# Patient Record
Sex: MALE | Race: WHITE | NOT HISPANIC OR LATINO | URBAN - METROPOLITAN AREA
[De-identification: names, ages, dates, MRNs, and addresses within clinical notes are randomized per-mention and may not be internally consistent; named-entity substitution may affect disease eponyms.]

---

## 2018-04-24 ENCOUNTER — EMERGENCY (EMERGENCY)
Facility: HOSPITAL | Age: 43
LOS: 1 days | Discharge: HOME | End: 2018-04-24
Attending: EMERGENCY MEDICINE | Admitting: EMERGENCY MEDICINE
Payer: MEDICAID

## 2018-04-24 VITALS
WEIGHT: 169.98 LBS | OXYGEN SATURATION: 100 % | SYSTOLIC BLOOD PRESSURE: 117 MMHG | DIASTOLIC BLOOD PRESSURE: 81 MMHG | TEMPERATURE: 98 F | RESPIRATION RATE: 17 BRPM | HEART RATE: 97 BPM

## 2018-04-24 DIAGNOSIS — S00.81XA ABRASION OF OTHER PART OF HEAD, INITIAL ENCOUNTER: ICD-10-CM
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
## 2018-04-24 DIAGNOSIS — S00.83XA CONTUSION OF OTHER PART OF HEAD, INITIAL ENCOUNTER: ICD-10-CM

## 2018-04-24 DIAGNOSIS — F10.129 ALCOHOL ABUSE WITH INTOXICATION, UNSPECIFIED: ICD-10-CM

## 2018-04-24 DIAGNOSIS — F10.929 ALCOHOL USE, UNSPECIFIED WITH INTOXICATION, UNSPECIFIED: ICD-10-CM
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
## 2018-04-24 LAB — ETHANOL SERPL-MCNC: <3 MG/DL — SIGNIFICANT CHANGE UP

## 2018-04-24 PROCEDURE — 70450 CT HEAD/BRAIN W/O DYE: CPT | Mod: 26

## 2018-04-24 PROCEDURE — 99220: CPT

## 2018-04-24 RX ADMIN — Medication 25 MILLIGRAM(S): at 06:31

## 2018-04-24 NOTE — ED ADULT TRIAGE NOTE - CHIEF COMPLAINT QUOTE
pt. aaox3, bought in by EMS due to ETOH. pt. states he was drinking 36 beers the past two days and is having shakes, pt. speaking complete full sentences, steady gait.

## 2018-04-24 NOTE — ED ADULT NURSE NOTE - OBJECTIVE STATEMENT
Pt presents to ED with c/o EtOH withdrawal. Patient states his last drink was 5 minutes previous to arrival. No tremulousness or tongue fasciculations appreciated.  Patient with abrasion/scabbing to forehead, GCS 15, ambulatory.

## 2018-04-24 NOTE — ED PROVIDER NOTE - MEDICAL DECISION MAKING DETAILS
facial abrasion, alcohol use, will CT head, check blood alcohol, observe for patient safety, patient brought to ED by EMS

## 2018-04-24 NOTE — ED ADULT NURSE NOTE - CHPI ED SYMPTOMS NEG
no abdominal distension/no pain/no confusion/no abdominal pain/no nausea/no fever/no vomiting/no weakness/no disorientation/no chills

## 2020-01-11 ENCOUNTER — EMERGENCY (EMERGENCY)
Facility: HOSPITAL | Age: 45
LOS: 1 days | Discharge: HOME | End: 2020-01-11
Admitting: EMERGENCY MEDICINE
Payer: SELF-PAY

## 2020-01-11 VITALS
DIASTOLIC BLOOD PRESSURE: 76 MMHG | OXYGEN SATURATION: 96 % | HEART RATE: 94 BPM | RESPIRATION RATE: 20 BRPM | TEMPERATURE: 99 F | SYSTOLIC BLOOD PRESSURE: 131 MMHG

## 2020-01-11 VITALS
SYSTOLIC BLOOD PRESSURE: 137 MMHG | OXYGEN SATURATION: 97 % | HEART RATE: 98 BPM | WEIGHT: 203.05 LBS | RESPIRATION RATE: 18 BRPM | TEMPERATURE: 98 F | HEIGHT: 69 IN | DIASTOLIC BLOOD PRESSURE: 83 MMHG

## 2020-01-11 PROBLEM — Z78.9 OTHER SPECIFIED HEALTH STATUS: Chronic | Status: ACTIVE | Noted: 2018-04-24

## 2020-01-11 LAB
ALBUMIN SERPL ELPH-MCNC: 4.1 G/DL — SIGNIFICANT CHANGE UP (ref 3.4–5)
ALP SERPL-CCNC: 94 U/L — SIGNIFICANT CHANGE UP (ref 40–120)
ALT FLD-CCNC: 37 U/L — SIGNIFICANT CHANGE UP (ref 12–42)
ANION GAP SERPL CALC-SCNC: 6 MMOL/L — LOW (ref 9–16)
APPEARANCE UR: CLEAR — SIGNIFICANT CHANGE UP
AST SERPL-CCNC: 37 U/L — SIGNIFICANT CHANGE UP (ref 15–37)
BASOPHILS # BLD AUTO: 0.04 K/UL — SIGNIFICANT CHANGE UP (ref 0–0.2)
BASOPHILS NFR BLD AUTO: 0.2 % — SIGNIFICANT CHANGE UP (ref 0–2)
BILIRUB SERPL-MCNC: 0.5 MG/DL — SIGNIFICANT CHANGE UP (ref 0.2–1.2)
BILIRUB UR-MCNC: NEGATIVE — SIGNIFICANT CHANGE UP
BUN SERPL-MCNC: 15 MG/DL — SIGNIFICANT CHANGE UP (ref 7–23)
CALCIUM SERPL-MCNC: 9.1 MG/DL — SIGNIFICANT CHANGE UP (ref 8.5–10.5)
CHLORIDE SERPL-SCNC: 100 MMOL/L — SIGNIFICANT CHANGE UP (ref 96–108)
CO2 SERPL-SCNC: 30 MMOL/L — SIGNIFICANT CHANGE UP (ref 22–31)
COLOR SPEC: YELLOW — SIGNIFICANT CHANGE UP
CREAT SERPL-MCNC: 1 MG/DL — SIGNIFICANT CHANGE UP (ref 0.5–1.3)
DIFF PNL FLD: NEGATIVE — SIGNIFICANT CHANGE UP
EOSINOPHIL # BLD AUTO: 0.03 K/UL — SIGNIFICANT CHANGE UP (ref 0–0.5)
EOSINOPHIL NFR BLD AUTO: 0.2 % — SIGNIFICANT CHANGE UP (ref 0–6)
ETHANOL SERPL-MCNC: <3 MG/DL — SIGNIFICANT CHANGE UP
GLUCOSE SERPL-MCNC: 109 MG/DL — HIGH (ref 70–99)
GLUCOSE UR QL: NEGATIVE — SIGNIFICANT CHANGE UP
HCT VFR BLD CALC: 44.8 % — SIGNIFICANT CHANGE UP (ref 39–50)
HGB BLD-MCNC: 15.6 G/DL — SIGNIFICANT CHANGE UP (ref 13–17)
IMM GRANULOCYTES NFR BLD AUTO: 0.4 % — SIGNIFICANT CHANGE UP (ref 0–1.5)
KETONES UR-MCNC: NEGATIVE — SIGNIFICANT CHANGE UP
LEUKOCYTE ESTERASE UR-ACNC: NEGATIVE — SIGNIFICANT CHANGE UP
LYMPHOCYTES # BLD AUTO: 1.25 K/UL — SIGNIFICANT CHANGE UP (ref 1–3.3)
LYMPHOCYTES # BLD AUTO: 7.7 % — LOW (ref 13–44)
MCHC RBC-ENTMCNC: 29.4 PG — SIGNIFICANT CHANGE UP (ref 27–34)
MCHC RBC-ENTMCNC: 34.8 GM/DL — SIGNIFICANT CHANGE UP (ref 32–36)
MCV RBC AUTO: 84.5 FL — SIGNIFICANT CHANGE UP (ref 80–100)
MONOCYTES # BLD AUTO: 1.32 K/UL — HIGH (ref 0–0.9)
MONOCYTES NFR BLD AUTO: 8.2 % — SIGNIFICANT CHANGE UP (ref 2–14)
NEUTROPHILS # BLD AUTO: 13.48 K/UL — HIGH (ref 1.8–7.4)
NEUTROPHILS NFR BLD AUTO: 83.3 % — HIGH (ref 43–77)
NITRITE UR-MCNC: NEGATIVE — SIGNIFICANT CHANGE UP
NRBC # BLD: 0 /100 WBCS — SIGNIFICANT CHANGE UP (ref 0–0)
NT-PROBNP SERPL-SCNC: 33 PG/ML — SIGNIFICANT CHANGE UP
PCP SPEC-MCNC: SIGNIFICANT CHANGE UP
PH UR: 7 — SIGNIFICANT CHANGE UP (ref 5–8)
PLATELET # BLD AUTO: 302 K/UL — SIGNIFICANT CHANGE UP (ref 150–400)
POTASSIUM SERPL-MCNC: 5 MMOL/L — SIGNIFICANT CHANGE UP (ref 3.5–5.3)
POTASSIUM SERPL-SCNC: 5 MMOL/L — SIGNIFICANT CHANGE UP (ref 3.5–5.3)
PROT SERPL-MCNC: 8.1 G/DL — SIGNIFICANT CHANGE UP (ref 6.4–8.2)
PROT UR-MCNC: NEGATIVE MG/DL — SIGNIFICANT CHANGE UP
RBC # BLD: 5.3 M/UL — SIGNIFICANT CHANGE UP (ref 4.2–5.8)
RBC # FLD: 12.6 % — SIGNIFICANT CHANGE UP (ref 10.3–14.5)
SODIUM SERPL-SCNC: 136 MMOL/L — SIGNIFICANT CHANGE UP (ref 132–145)
SP GR SPEC: 1.01 — SIGNIFICANT CHANGE UP (ref 1–1.03)
TROPONIN I SERPL-MCNC: <0.017 NG/ML — LOW (ref 0.02–0.06)
TSH SERPL-MCNC: 4.58 UIU/ML — HIGH (ref 0.36–3.74)
UROBILINOGEN FLD QL: 0.2 E.U./DL — SIGNIFICANT CHANGE UP
WBC # BLD: 16.19 K/UL — HIGH (ref 3.8–10.5)
WBC # FLD AUTO: 16.19 K/UL — HIGH (ref 3.8–10.5)

## 2020-01-11 PROCEDURE — 71046 X-RAY EXAM CHEST 2 VIEWS: CPT | Mod: 26

## 2020-01-11 PROCEDURE — 99285 EMERGENCY DEPT VISIT HI MDM: CPT | Mod: 25

## 2020-01-11 PROCEDURE — 93010 ELECTROCARDIOGRAM REPORT: CPT

## 2020-01-11 RX ORDER — SODIUM CHLORIDE 9 MG/ML
1000 INJECTION INTRAMUSCULAR; INTRAVENOUS; SUBCUTANEOUS ONCE
Refills: 0 | Status: COMPLETED | OUTPATIENT
Start: 2020-01-11 | End: 2020-01-11

## 2020-01-11 RX ADMIN — SODIUM CHLORIDE 1000 MILLILITER(S): 9 INJECTION INTRAMUSCULAR; INTRAVENOUS; SUBCUTANEOUS at 12:46

## 2020-01-11 NOTE — ED ADULT NURSE NOTE - NSIMPLEMENTINTERV_GEN_ALL_ED
Implemented All Fall with Harm Risk Interventions:  [place] to call system. Call bell, personal items and telephone within reach. Instruct patient to call for assistance. Room bathroom lighting operational. Non-slip footwear when patient is off stretcher. Physically safe environment: no spills, clutter or unnecessary equipment. Stretcher in lowest position, wheels locked, appropriate side rails in place. Provide visual cue, wrist band, yellow gown, etc. Monitor gait and stability. Monitor for mental status changes and reorient to person, place, and time. Review medications for side effects contributing to fall risk. Reinforce activity limits and safety measures with patient and family. Provide visual clues: red socks.

## 2020-01-11 NOTE — ED PROVIDER NOTE - PATIENT PORTAL LINK FT
You can access the FollowMyHealth Patient Portal offered by [facility] by registering at the following website: [email or web address]. By joining [email or web address]’s FollowMyHealth portal, you will also be able to view your health information using other applications (apps) compatible with our system.

## 2020-01-11 NOTE — ED ADULT TRIAGE NOTE - CHIEF COMPLAINT QUOTE
c/o heart racing and then slowing with shortness of breath starting 20-25 min PTA while at work. as per pt, started when he got to work and then started when he was eating. denies chest tightness or itching. no PMHX.

## 2020-01-11 NOTE — ED PROVIDER NOTE - NSFOLLOWUPINSTRUCTIONS_ED_ALL_ED_FT
PLEASE FOLLOW-UP WITH YOUR PRIMARY CARE DOCTOR IN 1-2 DAYS FOR FURTHER EVALUATION.  PLEASE TAKE ALL PAPERWORK FROM TODAY'S VISIT TO YOUR PRIMARY DOCTOR.  IF YOU DO NOT HAVE A PRIMARY CARE DOCTOR PLEASE REFER TO THE OFFICE/CLINIC INFORMATION GIVEN ABOVE.  YOU MAY ALSO CALL [phone] AND ASK FOR MS. ELISSA NUNEZ.  SHE CAN HELP YOU MAKE A FOLLOW-UP APPOINTMENT.  HER HOURS ARE 11AM-7PM MONDAY - FRIDAY.    PLEASE FOLLOW UP WITH DR. HAIR (CARDIOLOGIST) LISTED HERE WITHIN 1-2 DAYS AS DISCUSSED.    PLEASE RETURN TO THE ER IMMEDIATELY OR CALL 911 FOR ANY HIGH FEVER, CHEST PAIN, TROUBLE BREATHING, VOMITING, SEVERE PAIN, OR ANY OTHER CONCERNS

## 2020-01-11 NOTE — ED PROVIDER NOTE - CLINICAL SUMMARY MEDICAL DECISION MAKING FREE TEXT BOX
EKG, labs and CXR reviewed, and results discussed with pt. He now reports feeling much better after taking a nap in the ED and is sitting comfortably in NAD. AFVSS. Pt agrees to F/U with Primary Care and Cardiology this week for further evaluation as instructed. Strict return precautions reviewed with pt in which pt verbalizes understanding and agrees to.

## 2020-01-11 NOTE — ED PROVIDER NOTE - OBJECTIVE STATEMENT
45 y/o M presents to the ED c/o an episode of chest tightness, SOB, racing palpitations, paresthesias to B/L hands and feet, and lightheadedness this morning. The sx's were constant without any aggravating or alleviating factors. His sx's began this morning and lasted for approximately 25 minutes before slowly improving. He states he is now starting to feel better and still has some residual sx's. He reports having a similar episode approximately 2 years ago for which he seeked medical care with no cause for his sx's identified.    Denies fever, chills, headache, confusion, LOC, abdo pain, N/V/D, focal weakness, fall or injury

## 2020-01-11 NOTE — ED PROVIDER NOTE - NSFOLLOWUPCLINICS_GEN_ALL_ED_FT
[facility]  Family Medicine  [address]  [place], [place] [address]  Phone: [phone]  Fax:   Follow Up Time: Urgent

## 2020-01-11 NOTE — ED PROVIDER NOTE - CARE PROVIDER_API CALL
Julian Crowell)  Cardiovascular Disease  [address], 3rd [place] [address]  Phone: [phone]  Fax: [phone]  Follow Up Time: 1-3 Days

## 2020-01-11 NOTE — ED ADULT NURSE NOTE - OBJECTIVE STATEMENT
45 yo M c.o sudden onset of chest pain. Pt states "I was at work and felt a sharp pain in the middle of my chest, I tried to eat and but it made it worse. so my boss told me to come to the ED. the pain feels sharp and my heart starts racing and I feel like my heart is growing". Pt also c.o dizziness and HA of 10/10at this time. Pt denies n/t to bl upper and lower extremities, n/v/d/f. Pt is A&Ox3.

## 2020-01-15 DIAGNOSIS — R07.89 OTHER CHEST PAIN: ICD-10-CM

## 2020-09-17 ENCOUNTER — HOSPITAL ENCOUNTER (INPATIENT)
Dept: HOSPITAL 74 - YASAS | Age: 45
LOS: 3 days | Discharge: HOME | End: 2020-09-20
Attending: ALLERGY & IMMUNOLOGY | Admitting: ALLERGY & IMMUNOLOGY
Payer: COMMERCIAL

## 2020-09-17 VITALS — BODY MASS INDEX: 31.6 KG/M2

## 2020-09-17 DIAGNOSIS — Z59.0: ICD-10-CM

## 2020-09-17 DIAGNOSIS — F10.230: Primary | ICD-10-CM

## 2020-09-17 DIAGNOSIS — F17.210: ICD-10-CM

## 2020-09-17 DIAGNOSIS — F19.24: ICD-10-CM

## 2020-09-17 LAB
ALBUMIN SERPL-MCNC: 3.5 G/DL (ref 3.4–5)
ALP SERPL-CCNC: 104 U/L (ref 45–117)
ALT SERPL-CCNC: 57 U/L (ref 13–61)
ANION GAP SERPL CALC-SCNC: 6 MMOL/L (ref 8–16)
AST SERPL-CCNC: 21 U/L (ref 15–37)
BILIRUB SERPL-MCNC: 0.8 MG/DL (ref 0.2–1)
BUN SERPL-MCNC: 17.1 MG/DL (ref 7–18)
CALCIUM SERPL-MCNC: 9.1 MG/DL (ref 8.5–10.1)
CHLORIDE SERPL-SCNC: 105 MMOL/L (ref 98–107)
CO2 SERPL-SCNC: 28 MMOL/L (ref 21–32)
CREAT SERPL-MCNC: 1 MG/DL (ref 0.55–1.3)
DEPRECATED RDW RBC AUTO: 14.1 % (ref 11.9–15.9)
GLUCOSE SERPL-MCNC: 107 MG/DL (ref 74–106)
HCT VFR BLD CALC: 42.5 % (ref 35.4–49)
HGB BLD-MCNC: 14.4 GM/DL (ref 11.7–16.9)
MCH RBC QN AUTO: 29.8 PG (ref 25.7–33.7)
MCHC RBC AUTO-ENTMCNC: 33.8 G/DL (ref 32–35.9)
MCV RBC: 88.1 FL (ref 80–96)
PLATELET # BLD AUTO: 304 K/MM3 (ref 134–434)
PMV BLD: 9.8 FL (ref 7.5–11.1)
POTASSIUM SERPLBLD-SCNC: 4.5 MMOL/L (ref 3.5–5.1)
PROT SERPL-MCNC: 7.2 G/DL (ref 6.4–8.2)
RBC # BLD AUTO: 4.82 M/MM3 (ref 4–5.6)
SODIUM SERPL-SCNC: 140 MMOL/L (ref 136–145)
WBC # BLD AUTO: 9.6 K/MM3 (ref 4–10)

## 2020-09-17 PROCEDURE — U0003 INFECTIOUS AGENT DETECTION BY NUCLEIC ACID (DNA OR RNA); SEVERE ACUTE RESPIRATORY SYNDROME CORONAVIRUS 2 (SARS-COV-2) (CORONAVIRUS DISEASE [COVID-19]), AMPLIFIED PROBE TECHNIQUE, MAKING USE OF HIGH THROUGHPUT TECHNOLOGIES AS DESCRIBED BY CMS-2020-01-R: HCPCS

## 2020-09-17 PROCEDURE — HZ2ZZZZ DETOXIFICATION SERVICES FOR SUBSTANCE ABUSE TREATMENT: ICD-10-PCS | Performed by: ALLERGY & IMMUNOLOGY

## 2020-09-17 PROCEDURE — G0009 ADMIN PNEUMOCOCCAL VACCINE: HCPCS

## 2020-09-17 RX ADMIN — Medication SCH: at 23:07

## 2020-09-17 RX ADMIN — HYDROXYZINE PAMOATE SCH: 25 CAPSULE ORAL at 23:07

## 2020-09-17 RX ADMIN — HYDROXYZINE PAMOATE SCH MG: 25 CAPSULE ORAL at 17:52

## 2020-09-17 RX ADMIN — HYDROXYZINE PAMOATE SCH MG: 25 CAPSULE ORAL at 15:18

## 2020-09-17 SDOH — ECONOMIC STABILITY - HOUSING INSECURITY: HOMELESSNESS: Z59.0

## 2020-09-17 NOTE — BHS.RME
Substance Use & Tx History





- Substance Use History


  ** Alcohol


Substance amount: 6 cans 40 oz, + 1 pint Domi


Frequency of use: Daily


Substance route: Oral


Date of Last Use: 09/16/20





  ** Nicotine


Substance amount: 1 pack


Frequency of use: Daily


Substance route: Smoking


Date of Last Use: 09/17/20





Physical/Psych/Mental Status





- Behavior


General Behavior: Increased activity (restlessness, agitation)


Eye Contact: Normal





- Cooperativeness


Cooperativeness: Cooperative





- Thinking


Thought Processes: Tight, Logical, Goal Directed


Thought content: Future oriented





- Physical Health Problems


Is patient presently having any pain?: No


Does patient presently have any injuries (include location): No


Does patient currently have a fever: No


Is patient pregnant: No





CIWA


Nausea/Vomiting: 3


Muscle Tremors: 4-Moderate,w/Arms Extend


Anxiety: 3


Agitation: 3


Paroxysmal Sweats: 3


Orientation: 0-Oriented


Tacttile Disturbances: 0-None


Auditory Disturbances: 0-None


Visual Disturbances: 0-None


Headache: 2-Mild


CIWA-Ar Total Score: 18
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
## 2020-09-17 NOTE — EKG
Test Reason : 

Blood Pressure : ***/*** mmHG

Vent. Rate : 080 BPM     Atrial Rate : 080 BPM

   P-R Int : 146 ms          QRS Dur : 086 ms

    QT Int : 366 ms       P-R-T Axes : 028 051 -23 degrees

   QTc Int : 422 ms

 

NORMAL SINUS RHYTHM

T WAVE ABNORMALITY, CONSIDER LATERAL ISCHEMIA

ABNORMAL ECG

WHEN COMPARED WITH ECG OF 18-APR-2018 09:30,

NO SIGNIFICANT CHANGE WAS FOUND

Confirmed by PRETTY SOLOMON MD ([id number]) on 9/17/2020 4:31:21 PM

 

Referred By:             Confirmed By:PRETTY SOLOMON MD

## 2020-09-18 RX ADMIN — HYDROXYZINE PAMOATE SCH MG: 25 CAPSULE ORAL at 10:26

## 2020-09-18 RX ADMIN — Medication SCH MG: at 22:10

## 2020-09-18 RX ADMIN — Medication SCH TAB: at 10:25

## 2020-09-18 RX ADMIN — HYDROXYZINE PAMOATE SCH MG: 25 CAPSULE ORAL at 13:03

## 2020-09-18 RX ADMIN — HYDROXYZINE PAMOATE SCH MG: 25 CAPSULE ORAL at 22:10

## 2020-09-18 RX ADMIN — HYDROXYZINE PAMOATE SCH MG: 25 CAPSULE ORAL at 07:08

## 2020-09-18 RX ADMIN — HYDROXYZINE PAMOATE SCH MG: 25 CAPSULE ORAL at 17:44

## 2020-09-18 RX ADMIN — NICOTINE SCH MG: 7 PATCH TRANSDERMAL at 10:26

## 2020-09-18 NOTE — PN
[facility] CIWA





- CIWA Score


Nausea/Vomitin-Mild Nausea/No Vomiting


Muscle Tremors: 3


Anxiety: 2


Agitation: 2


Paroxysmal Sweats: No Perspiration


Orientation: 0-Oriented


Tacttile Disturbances: 1-Very Mild Itch/Numbness


Auditory Disturbances: 0-None


Visual Disturbances: 0-None


Headache: 1-Very Mild


CIWA-Ar Total Score: 10





BHS Progress Note (SOAP)


Subjective: 





alert,irritable,anxious,interrupted sleep,aching pain in the body,interrupted 

sleep


Objective: 





20 14:09


                                   Vital Signs











Temperature  98.0 F   20 12:47


 


Pulse Rate  75   20 12:47


 


Respiratory Rate  20   20 12:47


 


Blood Pressure  99/61   20 12:47


 


O2 Sat by Pulse Oximetry (%)  96   20 12:47








                             Laboratory Last Values











WBC  9.6 K/mm3 (4.0-10.0)   20  13:45    


 


RBC  4.82 M/mm3 (4.00-5.60)   20  13:45    


 


Hgb  14.4 GM/dL (11.7-16.9)   20  13:45    


 


Hct  42.5 % (35.4-49)   20  13:45    


 


MCV  88.1 fl (80-96)   20  13:45    


 


MCH  29.8 pg (25.7-33.7)   20  13:45    


 


MCHC  33.8 g/dl (32.0-35.9)   20  13:45    


 


RDW  14.1 % (11.9-15.9)   20  13:45    


 


Plt Count  304 K/MM3 (134-434)  D 20  13:45    


 


MPV  9.8 fl (7.5-11.1)   20  13:45    


 


Sodium  140 mmol/L (136-145)   20  13:45    


 


Potassium  4.5 mmol/L (3.5-5.1)   20  13:45    


 


Chloride  105 mmol/L ()   20  13:45    


 


Carbon Dioxide  28 mmol/L (21-32)   20  13:45    


 


Anion Gap  6 MMOL/L (8-16)  L  20  13:45    


 


BUN  17.1 mg/dL (7-18)   20  13:45    


 


Creatinine  1.0 mg/dL (0.55-1.3)   20  13:45    


 


Est GFR (CKD-EPI)AfAm  104.88   20  13:45    


 


Est GFR (CKD-EPI)NonAf  90.49   20  13:45    


 


Random Glucose  107 mg/dL ()  H  20  13:45    


 


Calcium  9.1 mg/dL (8.5-10.1)   20  13:45    


 


Total Bilirubin  0.8 mg/dL (0.2-1)   20  13:45    


 


AST  21 U/L (15-37)   20  13:45    


 


ALT  57 U/L (13-61)   20  13:45    


 


Alkaline Phosphatase  104 U/L ()   20  13:45    


 


Total Protein  7.2 g/dl (6.4-8.2)   20  13:45    


 


Albumin  3.5 g/dl (3.4-5.0)   20  13:45    


 


Syphilis Serology  Non-reactive  (NONREACTIVE)   20  13:45    


 


COVID-19 (RICHARD)  Not detected  (Not Detected)   20  13:40    


 


HIV Ag/Ab Combo Qual  Negative  (NEGATIVE)   20  13:45    











Assessment: 





20 14:11


withdrawal symptom


Plan: 





continue detox libium regimen

## 2020-09-19 RX ADMIN — HYDROXYZINE PAMOATE SCH: 25 CAPSULE ORAL at 14:39

## 2020-09-19 RX ADMIN — HYDROXYZINE PAMOATE SCH MG: 25 CAPSULE ORAL at 22:32

## 2020-09-19 RX ADMIN — HYDROXYZINE PAMOATE SCH MG: 25 CAPSULE ORAL at 05:35

## 2020-09-19 RX ADMIN — NICOTINE SCH MG: 7 PATCH TRANSDERMAL at 10:25

## 2020-09-19 RX ADMIN — Medication SCH TAB: at 10:24

## 2020-09-19 RX ADMIN — HYDROXYZINE PAMOATE SCH MG: 25 CAPSULE ORAL at 10:24

## 2020-09-19 RX ADMIN — HYDROXYZINE PAMOATE SCH MG: 25 CAPSULE ORAL at 17:24

## 2020-09-19 RX ADMIN — Medication SCH MG: at 22:32

## 2020-09-19 NOTE — PN
S CIWA





- CIWA Score


Nausea/Vomitin-No Nausea/No Vomiting


Muscle Tremors: 2


Anxiety: 2


Agitation: 0-Normal Activity


Paroxysmal Sweats: 2


Orientation: 0-Oriented


Tacttile Disturbances: 0-None


Auditory Disturbances: 0-None


Visual Disturbances: 0-None


Headache: 2-Mild


CIWA-Ar Total Score: 8





BHS Progress Note (SOAP)


Subjective: 





c/o sweats, anxiety, shakes, and headache.


Objective: 





20 09:41


                                   Vital Signs











  20





  06:18 09:08


 


Temperature 96.9 F L 98.2 F


 


Pulse Rate 65 78


 


Respiratory 18 18





Rate  


 


Blood Pressure 100/60 113/62


 


O2 Sat by Pulse 96 





Oximetry (%)  








                             Laboratory Last Values











WBC  9.6 K/mm3 (4.0-10.0)   20  13:45    


 


RBC  4.82 M/mm3 (4.00-5.60)   20  13:45    


 


Hgb  14.4 GM/dL (11.7-16.9)   20  13:45    


 


Hct  42.5 % (35.4-49)   20  13:45    


 


MCV  88.1 fl (80-96)   20  13:45    


 


MCH  29.8 pg (25.7-33.7)   20  13:45    


 


MCHC  33.8 g/dl (32.0-35.9)   20  13:45    


 


RDW  14.1 % (11.9-15.9)   20  13:45    


 


Plt Count  304 K/MM3 (134-434)  D 20  13:45    


 


MPV  9.8 fl (7.5-11.1)   20  13:45    


 


Sodium  140 mmol/L (136-145)   20  13:45    


 


Potassium  4.5 mmol/L (3.5-5.1)   20  13:45    


 


Chloride  105 mmol/L ()   20  13:45    


 


Carbon Dioxide  28 mmol/L (21-32)   20  13:45    


 


Anion Gap  6 MMOL/L (8-16)  L  20  13:45    


 


BUN  17.1 mg/dL (7-18)   20  13:45    


 


Creatinine  1.0 mg/dL (0.55-1.3)   20  13:45    


 


Est GFR (CKD-EPI)AfAm  104.88   20  13:45    


 


Est GFR (CKD-EPI)NonAf  90.49   20  13:45    


 


Random Glucose  107 mg/dL ()  H  20  13:45    


 


Calcium  9.1 mg/dL (8.5-10.1)   20  13:45    


 


Total Bilirubin  0.8 mg/dL (0.2-1)   20  13:45    


 


AST  21 U/L (15-37)   20  13:45    


 


ALT  57 U/L (13-61)   20  13:45    


 


Alkaline Phosphatase  104 U/L ()   20  13:45    


 


Total Protein  7.2 g/dl (6.4-8.2)   20  13:45    


 


Albumin  3.5 g/dl (3.4-5.0)   20  13:45    


 


Syphilis Serology  Non-reactive  (NONREACTIVE)   20  13:45    


 


COVID-19 (RICHARD)  Not detected  (Not Detected)   20  13:40    


 


HIV Ag/Ab Combo Qual  Negative  (NEGATIVE)   20  13:45    








Labs noted.


Assessment: 





20 09:20


AOX3, in no acute respiratory distress.


Full ROM, ambulating in the unit.


Withdrawal symptoms.


Plan: 





continue detox.

## 2020-09-20 VITALS — TEMPERATURE: 96.9 F | SYSTOLIC BLOOD PRESSURE: 100 MMHG | HEART RATE: 70 BPM | DIASTOLIC BLOOD PRESSURE: 60 MMHG

## 2020-09-20 RX ADMIN — HYDROXYZINE PAMOATE SCH MG: 25 CAPSULE ORAL at 05:41

## 2020-09-20 NOTE — DS
[facility] Detox Discharge Summary


Admission Date: 


20





Discharge Date: 20





- History


Present History: Alcohol Dependence


Additional Comments: 





45 years old male was admitted on 20 for alcohol withdrawal sx management 


treating with librium detox regiment


mr billingsley prefers to go to [facility] today and go to [facility] tomorrow 

where he will stay 6 - 8 months 


alert oriented x 3 ambulating steady gait speech clearly coherently 





General Appearance: Yes: Within Normal Limits, No Apparent Distress, Nourished, 

Appropriately Dressed


Respiratory: Yes: Within Normal Limits, Lungs Clear, Normal Breath Sounds, No 

Respiratory Distress, No Accessory Muscle Use, 


health teaching on smoking cessation 


Cardiology: Yes: Within Normal Limits, Regular Rhythm, Regular Rate.  No: JVD, 

Murmur


Abdominal: Yes: Within Normal Limits, Normal Bowel Sounds, Non Tender, Flat, 

Soft.  No: Tenderness


Back: Yes: Within Normal Limits, Normal Inspection.  No: CVA Tenderness


Extremities: Yes: Within Normal Limits, Normal Inspection, Normal Range of 

Motion, Non-Tender.  No: Swelling


Neurological: Yes: Within Normal Limits, Fully Oriented, Alert, Normal 

Mood/Affect, Normal Response.  No: Numbness





Pertinent Past History: 





time for discharge 48 minutes


treatment team met with mr billingsley to discuss the benefits of librium completion 


mr billingsley insists to go home today "[facility] helps me in the past"


they know I am coming 





- Physical Exam Results


Vital Signs: 


                                   Vital Signs











Temperature  96.9 F L  20 06:08


 


Pulse Rate  70   20 06:08


 


Respiratory Rate  18   20 06:08


 


Blood Pressure  100/60   20 06:08


 


O2 Sat by Pulse Oximetry (%)  98   20 06:08











Pertinent Admission Physical Exam Findings: 





alcohol withdrawal 


                               Vital Signs - 24 hr











  20





  12:58 16:33 21:13


 


Temperature 97.7 F 97.7 F 97.3 F L


 


Pulse Rate 74 69 74


 


Respiratory 18 18 16





Rate   


 


Blood Pressure 104/68 127/68 109/77


 


O2 Sat by Pulse 95  95





Oximetry (%)   














  20





  06:08


 


Temperature 96.9 F L


 


Pulse Rate 70


 


Respiratory 18





Rate 


 


Blood Pressure 100/60


 


O2 Sat by Pulse 98





Oximetry (%) 








                                Laboratory Tests











  09/20





  13:40 13:45 13:45


 


WBC    9.6


 


RBC    4.82


 


Hgb    14.4


 


Hct    42.5


 


MCV    88.1


 


MCH    29.8


 


MCHC    33.8


 


RDW    14.1


 


Plt Count    304  D


 


MPV    9.8


 


Sodium   


 


Potassium   


 


Chloride   


 


Carbon Dioxide   


 


Anion Gap   


 


BUN   


 


Creatinine   


 


Est GFR (CKD-EPI)AfAm   


 


Est GFR (CKD-EPI)NonAf   


 


Random Glucose   


 


Calcium   


 


Total Bilirubin   


 


AST   


 


ALT   


 


Alkaline Phosphatase   


 


Total Protein   


 


Albumin   


 


Syphilis Serology   


 


COVID-19 (RICHARD)  Not detected  


 


HIV Ag/Ab Combo Qual   Negative 














  20





  13:45 13:45


 


WBC  


 


RBC  


 


Hgb  


 


Hct  


 


MCV  


 


MCH  


 


MCHC  


 


RDW  


 


Plt Count  


 


MPV  


 


Sodium  140 


 


Potassium  4.5 


 


Chloride  105 


 


Carbon Dioxide  28 


 


Anion Gap  6 L 


 


BUN  17.1 


 


Creatinine  1.0 


 


Est GFR (CKD-EPI)AfAm  104.88 


 


Est GFR (CKD-EPI)NonAf  90.49 


 


Random Glucose  107 H 


 


Calcium  9.1 


 


Total Bilirubin  0.8 


 


AST  21 


 


ALT  57 


 


Alkaline Phosphatase  104 


 


Total Protein  7.2 


 


Albumin  3.5 


 


Syphilis Serology   Non-reactive


 


COVID-19 (RICHARD)  


 


HIV Ag/Ab Combo Qual  








lab noted





- Treatment


Hospital Course: Detox Protocol Followed, Detoxed Safely, Responded well, 

Discharged Condition Good, Rehab Referral Accepted


Patient has Accepted a Rehab Referral to: [facility] 





- Medication


Discharge Medications: 


Ambulatory Orders





NK [No Known Home Medication]  20 











- Diagnosis


(1) Substance induced mood disorder


Current Visit: Yes   Status: Suspected   





(2) Alcohol dependence with uncomplicated withdrawal


Current Visit: Yes   Status: Acute   





(3) Nicotine dependence


Current Visit: Yes   Status: Acute   


Qualifiers: 


   Nicotine product type: cigarettes   Substance use status: in withdrawal   

Qualified Code(s): F17.213 - Nicotine dependence, cigarettes, with withdrawal   





- AMA


Did Patient Leave Against Medical Advice: No





CIWA Score





- CIWA Score


Nausea/Vomitin-No Nausea/No Vomiting


Muscle Tremors: 2


Anxiety: 2


Agitation: 0-Normal Activity


Paroxysmal Sweats: No Perspiration


Orientation: 0-Oriented


Tacttile Disturbances: 0-None


Auditory Disturbances: 0-None


Visual Disturbances: 0-None


Headache: 0-None Present


CIWA-Ar Total Score: 4

## 2020-09-21 NOTE — PN
Teaching Attending Note


Name of Resident: Darryl Bro





ATTENDING PHYSICIAN STATEMENT





I saw and evaluated the patient.


I reviewed the resident's note and discussed the case with the resident.


I agree with the resident's findings and plan as documented.








SUBJECTIVE:








OBJECTIVE:








ASSESSMENT AND PLAN:


Agree with resident's findings and plan for detox.

## 2020-10-02 ENCOUNTER — HOSPITAL ENCOUNTER (INPATIENT)
Dept: HOSPITAL 74 - FER | Age: 45
LOS: 7 days | Discharge: ANOTHER HOSPITAL | DRG: 753 | End: 2020-10-09
Attending: INTERNAL MEDICINE | Admitting: STUDENT IN AN ORGANIZED HEALTH CARE EDUCATION/TRAINING PROGRAM
Payer: COMMERCIAL

## 2020-10-02 VITALS — BODY MASS INDEX: 31.4 KG/M2

## 2020-10-02 DIAGNOSIS — D72.829: ICD-10-CM

## 2020-10-02 DIAGNOSIS — F31.5: Primary | ICD-10-CM
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
## 2020-10-02 DIAGNOSIS — F17.200: ICD-10-CM

## 2020-10-02 DIAGNOSIS — R45.851: ICD-10-CM
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
## 2020-10-02 DIAGNOSIS — R45.850: ICD-10-CM

## 2020-10-02 DIAGNOSIS — M25.552: ICD-10-CM

## 2020-10-02 DIAGNOSIS — F23: ICD-10-CM

## 2020-10-02 DIAGNOSIS — K92.1: ICD-10-CM

## 2020-10-02 DIAGNOSIS — F10.239: ICD-10-CM

## 2020-10-02 LAB
ALBUMIN SERPL-MCNC: 4.2 G/DL (ref 3.4–5)
ALP SERPL-CCNC: 97 U/L (ref 45–117)
ALT SERPL-CCNC: 40 U/L (ref 13–61)
ANION GAP SERPL CALC-SCNC: 11 MMOL/L (ref 8–16)
AST SERPL-CCNC: 27 U/L (ref 15–37)
BILIRUB SERPL-MCNC: 0.8 MG/DL (ref 0.2–1)
BUN SERPL-MCNC: 15 MG/DL (ref 7–18)
CALCIUM SERPL-MCNC: 8.7 MG/DL (ref 8.5–10)
CHLORIDE SERPL-SCNC: 103 MMOL/L (ref 98–107)
CO2 SERPL-SCNC: 22 MMOL/L (ref 21–32)
CREAT SERPL-MCNC: 1 MG/DL (ref 0.55–1.3)
DEPRECATED RDW RBC AUTO: 12.9 % (ref 11.9–15.9)
EPITH CASTS URNS QL MICRO: (no result) /HPF
GLUCOSE SERPL-MCNC: 112 MG/DL (ref 74–106)
HCT VFR BLD CALC: 43.1 % (ref 35.4–49)
HGB BLD-MCNC: 15.1 GM/DL (ref 11.7–16.9)
MCH RBC QN AUTO: 30.8 PG (ref 25.7–33.7)
MCHC RBC AUTO-ENTMCNC: 35 G/DL (ref 32–35.9)
MCV RBC: 88.1 FL (ref 80–96)
PLATELET # BLD AUTO: 304 K/MM3 (ref 134–434)
PLATELET BLD QL SMEAR: ADEQUATE
PMV BLD: 9.6 FL (ref 7.5–11.1)
POTASSIUM SERPLBLD-SCNC: 4 MMOL/L (ref 3.5–5.1)
PROT SERPL-MCNC: 7.4 G/DL (ref 6.4–8.2)
RBC # BLD AUTO: 4.89 M/MM3 (ref 4–5.6)
SODIUM SERPL-SCNC: 136 MMOL/L (ref 136–145)
WBC # BLD AUTO: 24.6 K/MM3 (ref 4–10.8)

## 2020-10-02 PROCEDURE — U0003 INFECTIOUS AGENT DETECTION BY NUCLEIC ACID (DNA OR RNA); SEVERE ACUTE RESPIRATORY SYNDROME CORONAVIRUS 2 (SARS-COV-2) (CORONAVIRUS DISEASE [COVID-19]), AMPLIFIED PROBE TECHNIQUE, MAKING USE OF HIGH THROUGHPUT TECHNOLOGIES AS DESCRIBED BY CMS-2020-01-R: HCPCS

## 2020-10-02 PROCEDURE — G0480 DRUG TEST DEF 1-7 CLASSES: HCPCS

## 2020-10-02 PROCEDURE — C9803 HOPD COVID-19 SPEC COLLECT: HCPCS

## 2020-10-02 NOTE — PDOC
History of Present Illness





- General


Chief Complaint: Alcohol intoxication


Stated Complaint: INTOX


History Source: Patient


Exam Limitations: No Limitations





- History of Present Illness


Initial Comments: 





10/02/20 21:47


This is a 45-year-old male with long history of alcohol abuse who comes in 

complaining of being intoxicated and in withdrawal.  Patient was recently 

released from [facility] approximately 10 days ago.  Patient also is complaining 

that he fell and is having left sided hip pain.  Patient denies any other 

complaints.





Allergies: as per nursing notes


Past Medical History: none


Social history: No smoking. +alcohol. No illicit drugs.


Surgical history: None





General:  No fevers or chills, no weakness, no weight loss 


HEENT: No change in vision.  No sore throat,. No ear pain


CardioVascular: no chest discomfort. No shortness of breath


Respiratory:No cough, or wheezing. 


Gastrointestinal:  no nausea, vomiting, diarrhea or constipation,  No rectal 

bleeding


Genitourinary:  No dysuria, hematuria, or frequency


Musculoskeletal: Left hip and pelvis pain


Neurologic: No headache, vertigo, dizziness or loss of consciousness


Psychiatric: nor depression 


Skin: No rashes or easy bruising


Endocrine: no increased thirst or abnormal weight change


Allergic: no skin or latex allergy


All other systems reviewed and normal





Exam:





General: Well-nourished well-developed individual, no acute distress


HEENT: Throat: Normal, tonsils normal, no erythema or exudate


               Neck: Supple, no meningeal signs, no lymphadenopathy


Eyes::Pupils equal reactive and round, extraocular motion intact


Chest: Nontender to palpation 


Cardiac: S1-S2 normal, regular rate and rhythm, no murmurs rubs or gallops


Respiratory: Lungs clear to auscultation bilateral


Abdomen: Soft, nondistended, normal bowel sounds, there is no tenderness on 

palpation diffusely


Extremities: Warm, dry, no cyanosis, clubbing, or edema


Present hip there is tenderness on palpation over the left hip however there is 

full range of motion with minimal discomfort.  Neurovascular distally is intact.


Skin: No rashes


Neuro: Alert and oriented x3, CN II - XII intact, nonfocal exam with normal 

strength, normal sensation, normal reflexes, normal gait, 


Psych: Normal mood and affect 





Past History





- Medical History


Allergies/Adverse Reactions: 


                                    Allergies











Allergy/AdvReac Type Severity Reaction Status Date / Time


 


No Known Allergies Allergy   Verified 09/17/20 12:48











Home Medications: 


Ambulatory Orders





NK [No Known Home Medication]  09/17/20 








Anemia: No


Asthma: No


Cancer: No


Cardiac Disorders: No


CVA: No


COPD: No


CHF: No


Diabetes: No


GI Disorders: No


 Disorders: No


HTN: No


Hypercholesterolemia: No


Kidney Stones: No


Seizures: No


Thyroid Disease: No


Other medical history: ETOH ABUSE





- Surgical History


Abdominal Surgery: No


Appendectomy: No


Cardiac Surgery: No


Cholecystectomy: No


Lung Surgery: Yes (Puncture lung 2010)


Neurologic Surgery: No


Orthopedic Surgery: No





- Reproductive History


Testicular Surgery: No





- Psycho-Social/Smoking History


Smoking History: Unknown if ever smoked


Have you smoked in the past 12 months: Yes


Number of Cigarettes Smoked Daily: 20


Cigars Per Day: 0


Information on smoking cessation initiated: Yes


'Breaking Loose' booklet given: 09/17/20





Trauma Specific PMHX





- Complaint Specific PMHX


Arthritis: No





*Physical Exam





- Vital Signs


                                Last Vital Signs











Temp Pulse Resp BP Pulse Ox


 


 100.5 F H  103 H  18   130/60   99 


 


 10/02/20 21:08  10/02/20 21:08  10/02/20 21:08  10/02/20 21:08  10/02/20 21:08














ED Treatment Course





- LABORATORY


CBC & Chemistry Diagram: 


                                 10/02/20 22:00





                                 10/02/20 22:00





Discharge





- Discharge Information


Problems reviewed: Yes


Clinical Impression/Diagnosis: 


Fever


Qualifiers:


 Fever type: unspecified Qualified Code(s): R50.9 - Fever, unspecified





Leukocytosis


Qualifiers:


 Leukocytosis type: unspecified Qualified Code(s): D72.829 - Elevated white 

blood cell count, unspecified





Alcohol withdrawal


Qualifiers:


 Complication of substance-induced condition: uncomplicated Qualified Code(s): 

F10.230 - Alcohol dependence with withdrawal, uncomplicated





Condition: Stable





- Admission


Yes





- Follow up/Referral





- Patient Discharge Instructions





- Post Discharge Activity

## 2020-10-02 NOTE — XMS
Summarization Of Episode

                           Created on:2020



Patient:BENJIE AGUSTIN

Sex:Male

:1975

External Reference #:[id number]





Demographics







                          Address                   H[place]EL[place]



                                                    [place] [address]

 

                          Home Phone                [phone]

 

                          Email Address             [email or web address] [place] [place]

 

                          Preferred Language        en

 

                          Marital Status            Not  or 

 

                          Muslim Affiliation     [place]

 

                          Race                      WH

 

                          Ethnic Group              Not  or 









Author







                          Organization              [facility]









Support







                Name            Relationship    Address         Phone

 

                UE              Unavailable     Unavailable     Unavailable

 

                BENJIE AGUSTIN SELF / SAME AS PATIENT HOMELESS        [phone]



                                                [place] [address] 

 

                NONE, NONE      3               Unavailable     [phone]

 

                UNEMPLOYED      Unavailable     HOMESLESS AS PER PATIENT Unavail

able



                                                [place] [address] 

 

                ANGELINA MEHTA OT              Unavailable     [phone]

 

                BENJIE AGUSTIN            HOMELESS        Unavailable



                                                [place] [address] 









Care Team Providers







                    Name                Role                Phone

 

                    AMEE FUENTESZACHMAIRA Unavailable         Marshall Pelletier MD Unavailable         Unavailable

 

                    MADELEINE Sierra MD     Unavailable         Unavailable

 

                    ED STAFF PHYSICIAN  Unavailable         Unavailable

 

                    EMEKA ALLEN Unavailable         Unavailable

 

                    Sherman             Unavailable         Unavailable









Re-disclosure Warning

The records that you are about to access may contain information from federally-
assisted alcohol or drug abuse programs. If such information is present, then 
the following federally mandated warning applies: This information has been 
disclosed to you from records protected by federal confidentiality rules (42 CFR
part 2). The federal rules prohibit you from making any further disclosure of 
this information unless further disclosure is expressly permitted by the written
consent of the person to whom it pertains or as otherwise permitted by 42 CFR 
part 2. A general authorization for the release of medical or other information 
is NOT sufficient for this purpose. The Federal rules restrict any use of the 
information to criminally investigate or prosecute any alcohol or drug abuse 
patient.The records that you are about to access may contain highly sensitive 
health information, the redisclosure of which is protected by Article 27-F of 
the [facility] Public Health law. If you continue you may haveaccess to 
information: Regarding HIV / AIDS; Provided by facilities licensed or operated 
by the [facility] Office of Mental Health; or Provided by the [facility]
Office for People With Developmental Disabilities. If such information is 
present, then the following [place] mandated warning applies: This 
information has been disclosed to you from confidential records which are 
protected by state law. State law prohibits you from making any further 
disclosure of this information without the specific written consent of the 
person to whom it pertains, or as otherwise permitted by law. Any unauthorized 
further disclosure in violation of state law may result in a fine or USP 
sentence or both. A general authorization for the release of medical or other 
information is NOT sufficient authorization for further disclosure.



Encounters







           Encounter  Providers  Location   Date       Indications Data Source(s

)

 

           Inpatient  Attender: Marshall [id number]      2020 [facility]

 - Marina Sierra              06:09:00 PM DISORDER   [facility]

al



                      MDAttender:            EDT -                 



                      Hector               2020            



                      EllisonAdmitter:            05:00:00 PM            



                      Marshall Sierra            EDT                   



                      MD                                          









                                        SCHIZOAFFECTIVE DISORDER

 

                                        Patient discharged.









           Inpatient  Attender: ARSH GUADALUPE H-HAL2     2020 12:39:

00            [facility]



                      ARSH RAttender: STAFF            AM EST - 

[address] [place] [address]



                      ED STAFF PHYSICIANAdmitter:            01:27:00 PM EDT    

        



                      ARSH CABAN RReferrer:                                  



                      ARSH CABAN R                                  









                                        Patient discharged.









           Inpatient  Attender: MAIRA LUBIN H-HAL6     2018 07:42

:00            [facility]



                      ALFREDOERAdmitter:            AM EDT - 2018       

     [facility]



                      MAIRA LUBIN            01:35:00 PM EDT           

 



                      PREMOPHERReferrer:                                  



                      MAIRA RAO                                  







Medications







       Medication Brand  Start  Product Dose   Route  Administrative Pharmacy [facility] 

Indications         Reaction            Description         Data



          Name Date Form           Instructions Instructions                    

 Source(s)

 

     24 HR nicoti     [id number]           active           Nicoderm C-

Q [facility]



     Nic[facility] ne 2020      [facility]                               [facility]



     0.583 MG/HR mg/24 03:43:      H}                                      Syste

m



     Transdermal hr   28 PM                                              



     Patch transd EDT                                               



     nicotine 14 ermal                                                   



     mg/24 hr film,                                                   



     transdermal extend                                                   



     film, ed                                                     



     extended releas                                                   



     release e                                                      

 

     aripiprazol ARIPip     [id number]           active           Aripi

prazole [facility]



     e 5 MG Oral razole       {tab(                                    Healt

h



     Tablet 5 mg 03:43:      s)}                                     System



     ARIPiprazol oral 03 PM                                              



     e 5 mg oral tablet EDT                                               



     tablet                                                        

 

     Sertraline sertra     [id number]           active           Sertra

line [facility]



     50 MG Oral line       {tab(                                    Health



     Tablet 50 mg 03:42:      s)}                                     System



     sertraline oral 53 PM                                              



     50 mg oral tablet EDT                                               



     tablet                                                        

 

     Lithium lithiu     [id number]           complet           Lithium 

[facility]



     Carbonate m 300       {cap(                ed             Carbonate Hea

lth



     300 MG Oral mg   02:04:      s)}                                     System



     Capsule oral 14 PM                                              



     lithium 300 capsul EDT                                               



     mg oral e                                                      



     capsule                                                        









      aripiprazole ARIPiprazole 2018       1     [id number]             active

             ARIPiprazole 5 mg oral

tablet; 1 tab(s) orally once a day Ordered: 4-May-2018       Start: 4-May-2018 E

nd: 

2018                              [facility]



     5 MG Oral 5 mg oral 02:03:57 PM      {tab(s)}                              

 Quantity: 30 [facility]      

Health



     Tablet tablet EDT                                          Refills: 0      

     System



     ARIPiprazole                                                               

   



     5 mg oral                                                                  



     tablet                                                                  









     Sertraline 100 MG sertraline 100 mg           1                   completed

                [facility]



     Oral Tablet Tablet, Ordered By:                                            

       Medical Center



     sertraline 100 mg Marissa-Mima  Guadalupe,                                    

               



     Tablet, Ordered By: MDDirections: 1                                        

           



     Marissa-Mima  Guadalupe, tablet oral daily on                                 

                  



     MDDirections: 1 waking                                                   



     tablet oral daily on                                                       

 



     waking                                                        

 

     Melatonin 3 MG Oral melatonin 3 mg           2                   completed 

               [facility]



     Tablet melatonin 3 mg Tablet, Ordered By:                                  

                 [facility]



     Tablet, Ordered By: Marissa-Mima  Guadalupe,                                  

                 



     Marissa-Mima  Guadalupe, MDDirections: 2                                      

             



     MDDirections: 2 tablet oral daily at                                       

            



     tablet oral daily at bedtime                                               

    



     bedtime                                                        

 

     aripiprazole 15 MG aripiprazole 15 mg           1                   complet

ed                [facility]



     Oral Tablet Tablet, Ordered By:                                            

       [facility]



     aripiprazole 15 mg Marissa-Mima  Guadalupe,                                   

                



     Tablet, Ordered By: MDDirections: 1                                        

           



     Marissa-Mima  Guadalupe, tablet oral daily at                                 

                  



     MDDirections: 1 bedtime                                                   



     tablet oral daily at                                                       

 



     bedtime                                                        







Insurance Providers







          Payer name Policy type Policy ID Covered   Covered party's Policy    P

larissa



                    / Coverage           party ID  relationship to [employer]    Inf

ormation



                    type                          [employer]              

 

          [facility]             [id number]                             [id number]

[id number]



          HEALTH NON                                                   



          CAP                                                         

 

          Medicaid E Medicaid  [id number]            1                   [id number]

W

 

          [employer]  Medicaid  [id number]            1                   [id number]



          Medicaid                                                    

 

          Self Pay  Self Pay                      1                   

 

          Medicaid  Medicaid  [id number]            1                   [id number]

 

                    W         [id number]            01                  [id number]

 

                    O                                                 

 

                                                                      

 

                    W         [id number]                              [id number]

 

                              [id number]              [id number]                  [id number]

 

                                                                      







Problems, Conditions, and Diagnoses







           Code       Display Name Description Problem Type Effective  Data



                                                       Dates      Source(s)

 

           F25.9      Schizoaffective Schizoaffective 75203-3    2020 [facility]

[facility]



                      disorder   disorder              12:00:00 AM Health System



                                                       EDT        

 

           F25.9      Schizoaffective Schizoaffective Diagnosis  2020 S 

[facility]



                      disorder,  disorder              07[facility]54[facility]

 

                      SCHIZOAFFECTIVE SCHIZOAFFECTIVE Diagnosis  2020 S 

[facility]



                      DISORDER   DISORDER              07:54:00 PM [facility]

 

           Z91.5      Personal history of History of Diagnosis  2020 S -

 [place]



                      self-harm  self-harm             07:54:00 PM [facility]

 

           F17.210    Nicotine   Cigarette nicotine Diagnosis  2020 S - 

[facility]



                      dependence, dependence without            07:54:00 PM [facility]

on



                      cigarettes, complication            EDT        Hospital



                      uncomplicated                                  

 

           D72.829    Elevated white Leukocytosis Diagnosis  2020 S - [place]

unt



                      blood cell count,                       07:54:00 PM [facility]

 

           Y90.0      Blood alcohol level BLOOD ALCOHOL LEVEL Diagnosis  

020 [facility]



                      of less than 20 OF LESS THAN 20            01:27:00 PM Med

ical



                      mg/100 ml  MG/100 ML             EDT        Center

 

           F10.229    Alcohol dependence ALCOHOL DEPENDENCE Diagnosis  

0 [facility]



                      with intoxication, WITH INTOXICATION,            01:27:00 

PM Medical



                      unspecified UNSPECIFIED            EDT        Center

 

           F14.20     Cocaine dependence, COCAINE DEPENDENCE, Diagnosis  

[facility] 

[place]



                      uncomplicated UNCOMPLICATED            01:27:00 PM Medical



                                                       EDT        Center

 

           Z59.0      Homelessness HOMELESSNESS Diagnosis  2020 [facility]

[facility]



                                                       01:27:00 PM Medical



                                                       EDT        Center

 

           Z56.0      Unemployment, UNEMPLOYMENT, Diagnosis  2020 [facility]



                      unspecified UNSPECIFIED            01:27:00 PM Medical



                                                       EDT        Center

 

           F32.9      Major depressive MAJOR DEPRESSIVE Diagnosis  2020 [facility]

[facility] [place]



                      disorder, single DISORDER, SINGLE            12:39:00 AM M

edical



                      episode,   EPISODE,              EST        Center



                      unspecified UNSPECIFIED                       







Surgeries/Procedures







             Procedure    Description  Date         Indications  Data Source(s)

 

             XR Chest Single AP view XR              2018                

[facility]



             Chest Single AP view              12:11:00 AM               System



                                       EDT -                     



                                       2018                



                                       12:11:00 AM               



                                       EDT                       

 

             Computed tomography of              2018                [facility]



             abdomen (procedure)              12:11:00 AM               System



                                       EDT -                     



                                       2018                



                                       12:11:00 AM               



                                       EDT                       

 

             Electrocardiographic              2018                [facility]



             procedure (procedure)              10:50:03 PM               System



                                       EDT -                     



                                       2018                



                                       10:54:00 PM               



                                       EDT                       







Results







                    ID                  Date                Data Source

 

                    [id number]  2020 11:57:00 AM EDT [facility]











          Name      Value     Range     Interpretation Code Description Data Fide

rce(s) Supporting



                                                                      Document(s

)

 

          SARS-CoV-2                                         [facility]    



          RNA Resp                                                    



          Ql                                                          



          RICHARD+probe                                                   









                                        This lab was ordered by [facility] a

nd reported by [facility].











                    ID                  Date                Data Source

 

                    [id number]         2020 01:40:00 PM EDT [facility]











          Name      Value     Range     Interpretation Description Data      Sup

porting



                                        Code                Source(s) Document(s

)

 

          SARS                                              [facility]   



          coronavirus 2                                                   



          RNA                                                         









                                        This lab was ordered by [facility]

[facility] and reported by [facility].











                    ID                  Date                Data Source

 

                    [id number]        2020 07:57:00 AM EDT [facility]











          Name      Value     Range     Interpretation Code Description Data Fide

rce(s) Supporting



                                                                      Document(s

)

 

          SARS-CoV-2                                         [facility] 



          RNA Resp                                          Health System 



          Ql                                                          



          RICHARD+probe                                                   









                                        This lab was ordered by [facility] a

nd reported by [facility].











                    ID                  Date                Data Source

 

                    [id number]  2020 11:11:00 PM EDT [facility]











          Name      Value     Range     Interpretation Code Description Data Fide

rce(s) Supporting



                                                                      Document(s

)

 

          SARS-CoV-2                                         [facility]    



          RNA Resp                                                    



          Ql                                                          



          RICHARD+probe                                                   









                                        This lab was ordered by [facility] a

nd reported by [facility].











                    ID                  Date                Data Source

 

                    [id number]      2020 06:36:00 PM EDT [facility]











          Name      Value     Range     Interpretation Description Data      Sup

porting



                                        Code                Source(s) Document(s

)

 

          Leukocytes 19.4                Above high WBC Count [facility] 



          [#/volume] in {10^3_uL            normal              Health    



          Unspecified }                                       System    



          specimen by                                                   



          Automated count                                                   

 

          Erythrocytes 5.50                Normal (applies RBC Count [facility] 



          [#/volume] in {10^6_uL            to non-numeric           Health    



          Blood by  }                   results)            System    



          Automated count                                                   

 

          Hemoglobin 15.6                Normal (applies Hemoglobin [facility] 



          [Mass/volume] in {gm/dL}             to non-numeric           Health  

  



          Blood                         results)            System    

 

          Erythrocyte mean 84.2 fl             Normal (applies MCV       [facility]

ore 



          corpuscular                     to non-numeric           Health    



          volume [Entitic                     results)            System    



          volume] by                                                   



          Automated count                                                   

 

          Hematocrit 46.3 %              Normal (applies Hematocrit [facility] 



          [Volume                       to non-numeric           Health    



          Fraction] of                     results)            System    



          Blood                                                       

 

          Erythrocyte mean 28.4 pg             Normal (applies MCH       [facility]

ore 



          corpuscular                     to non-numeric           Health    



          hemoglobin                     results)            System    



          [Entitic mass]                                                   



          by Automated                                                   



          count                                                       

 

          Erythrocyte 12.7 %              Normal (applies RDW-CV    [facility] 



          distribution                     to non-numeric           Health    



          width [Entitic                     results)            System    



          volume] by                                                   



          Automated count                                                   

 

          Platelets 334                 Normal (applies Platelet Count [facility]

e 



          [#/volume] in {10^3_uL            to non-numeric           Health    



          Plasma by }                   results)            System    



          Automated count                                                   

 

          Platelet mean 10.6 fl             Above high MPV       [facility] 



          volume [Entitic                     normal              Health    



          volume] in Blood                                         System    



          by Automated                                                   



          count                                                       

 

          Erythrocyte mean 33.7                Normal (applies [facility]      [facility]

[facility] 



          corpuscular {gm/dL}             to non-numeric           Health    



          hemoglobin                     results)            System    



          concentration                                                   



          [Mass/volume] by                                                   



          Automated count                                                   

 

          Eosinophils 0.10                Normal (applies Eosinophil # [facility]

e 



          [#/volume] in {10^3_uL            to non-numeric           Health    



          Blood     }                   results)            System    

 

          Neutrophils 16.3                Above high Neutrophil # [facility] 



          [#/volume] in {10^3_uL            normal              Health    



          Body fluid }                                       System    

 

          Lymphocyte 1.4                 Normal (applies Lymphocyte # [facility]

 



          percent   {10^3_uL            to non-numeric           Health    



          differential }                   results)            System    



          count                                                       



          (procedure)                                                   

 

          Monocytes 1.4                 Above high Monocyte # [facility] 



          [#/volume] in {10^3_uL            normal              Health    



          Blood by Manual }                                       System    



          count                                                       

 

          Basophils 0.09                Normal (applies Basophil # [facility] 



          [#/volume] in {10^3_uL            to non-numeric           Health    



          Blood by  }                   results)            System    



          Automated count                                                   

 

          Basophils/100 0.5 %               Normal (applies Basophil % [facility]

e 



          leukocytes in                     to non-numeric           Health    



          Unspecified                     results)            System    



          specimen by                                                   



          Manual count                                                   

 

          Lymphocytes 7.2 %               Below low normal Lymphocyte % [facility]

re 



          [#/volume] in                                         Health    



          Blood by                                          System    



          Automated count                                                   

 

          Monocytes/100 7.0 %               Normal (applies Monocyte % [facility]

e 



          leukocytes in                     to non-numeric           Health    



          Blood                         results)            System    

 

          Neutrophils/100 84.3 %              Above high Neutrophil % [facility]

 



          leukocytes in                     normal              Health    



          Blood by                                          System    



          Automated count                                                   

 

          Eosinophils/100 0.5 %               Normal (applies Eosinophil % [facility]

[facility] 



          leukocytes in                     to non-numeric           Health    



          Unspecified                     results)            System    



          specimen                                                    

 

          Nucleated 0.0                 Normal (applies NRBC %    [facility] 



          erythrocytes {/100_WB            to non-numeric           Health    



          [#/volume] in C}                  results)            System    



          Body fluid                                                   

 

          ImmatureGranuloc 0.5 %               Normal (applies Immature  [facility]

[facility] 



          ytes%                         to non-numeric Granulocytes % Health    



                                        results)            System    

 

          NRBC#     0.00                Below low normal NRBC #    [facility] 



                    {10^3_uL                                Health    



                    }                                       System    

 

          ImmatureGranuloc 0.09                Normal (applies Immature  [facility]

[facility] 



          ytes#     {10^3_uL            to non-numeric Granulocytes # Health    



                    }                   results)            System    











                    ID                  Date                Data Source

 

                    [id number]      2020 06:36:00 PM EDT [facility]

[facility]











          Name      Value     Range     Interpretation Description Data      Sup

porting



                                        Code                Source(s) Document(s

)

 

          Sodium    138                 Normal (applies Sodium, Serum [facility]

 



          [Moles/volume] in mmol/L              to non-numeric           Health 

   



          Serum or Plasma                     results)            System    

 

          Potassium 4.2                 Normal (applies Potassium, [facility] 



          [Mass/volume] in mmol/L              to non-numeric Serum     Health  

  



          Serum or Plasma                     results)            System    

 

          Glucose   103                 Normal (applies Glucose,  [facility] 



          [Mass/volume] in mg/dL               to non-numeric Serum     Health  

  



          Serum or Plasma                     results)            System    

 

          Carbon dioxide, 26.0                Normal (applies CO2, Serum [facility]

[facility] 



          total     mmol/L              to non-numeric           Health    



          [Moles/volume] in                     results)            System    



          Serum or Plasma                                                   

 

          Chloride  99                  Normal (applies Chloride, [facility] 



          [Moles/volume] in mmol/L              to non-numeric Serum     Health 

   



          Serum or Plasma                     results)            System    

 

          TotalProtein 7.8                 Normal (applies Total Protein [facility]

[facility] 



                    mg/dl               to non-numeric           Health    



                                        results)            System    

 

          Bilirubin.total 0.8                 Normal (applies Bilirubin, [facility]

[facility] 



          [Mass/volume] in mg/dl               to non-numeric Serum Total [facility]

    



          Serum or Plasma                     results)            System    

 

          Urea nitrogen 15                  Normal (applies Blood Urea [facility]

e 



          [Mass/volume] in mg/dl               to non-numeric Nitrogen, Health  

  



          Serum or Plasma                     results)  Serum     System    

 

          Creatinine 1.00                Normal (applies Creatinine, [facility] 



          [Mass/volume] in mg/dl               to non-numeric Serum     Health  

  



          Serum or Plasma                     results)            System    

 

          Alkaline  105                 Normal (applies Alkaline  [facility] 



          phosphatase {IU/L}              to non-numeric Phosphatase, Health    



          isoenzymes                     results)  Serum     System    



          [Enzymatic                                                   



          activity/volume]                                                   



          in Serum or Plasma                                                   



          by Heat stability                                                   

 

          Aspartate 26                  Normal (applies Aspartate [facility] 



          aminotransferase {IU/L}              to non-numeric Transaminase, Heal

th    



          [Enzymatic                     results)  Serum     System    



          activity/volume]                                                   



          in Serum or Plasma                                                   



          by With P-5'-P                                                   

 

          DirectBilirubin 0.4                 Normal (applies Direct    [facility]

re 



                    mg/dl               to non-numeric Bilirubin [facility]    



                                        results)            System    

 

          I.Phosphorus 4.2                 Normal (applies I. Phosphorus [facility]

[facility] 



                    mg/dl               to non-numeric           Health    



                                        results)            System    

 

          Albumin   4.7                 Normal (applies Albumin,  [facility] 



          [Mass/volume] in {gm/dl}             to non-numeric Serum     Health  

  



          Serum or Plasma                     results)            System    

 

          Urate     6.1                 Normal (applies Uric Acid, [facility] 



          [Mass/volume] in mg/dl               to non-numeric Serum     Health  

  



          Serum or Plasma                     results)            System    

 

          Calcium   9.4                 Normal (applies Calcium,  [facility] 



          [Mass/volume] in mg/dl               to non-numeric Total Serum Health

    



          Serum or Plasma                     results)            System    

 

          Alanine   26                  Normal (applies Alanine   [facility] 



          aminotransferase {IU/L}              to non-numeric Aminotransfer Heal

th    



          [Enzymatic                     results)  ase, Serum System    



          activity/volume]                                                   



          in Serum or Plasma                                                   

 

          A/GRatio  1.52                Normal (applies A/G Ratio [facility] 



                                        to non-numeric           Health    



                                        results)            System    

 

          Anion gap in Serum 13.00               Above high Anion Gap [facility]

 



          or Plasma mmol/L              normal              Health    



                                                            System    

 

          Glomerular 81.00               Normal (applies GFR       [facility] 



          filtration                     to non-numeric           Health    



          rate/1.73 sq                     results)            System    



          M.predicted                                                   



          [Volume Rate/Area]                                                   



          in Serum or Plasma                                                   



          by                                                          



          Creatinine-based                                                   



          formula (CKD-EPI)                                                   









                                        eGFR will provide clinicians with a more

 accurate indicator of renal function 

then



                                        the serum creatinine. The eGFR is automa

tically calculated from an empiric 

formula



                                        (endorsed by the [employer]

ion) which incorporates age, sex, and



                                        race.Clinicians may notice surprisingly 

low GFR's with serum creatinine 

valueswithin



                                        normal range- particularly in elderly wo

men (with low muscle mass).In the 

hospital



                                        setting, the eGFR should add an element 

of safety in drug dosing, in assessing 

the



                                        risk of IV contrast administration, and 

in assessing vascular risk.The [facility] 

staging



                                        system is as follows:Normal:    eGFR >90

 with no kidney markersStage 1:   eGFR 

>90



                                        with kidney markers*Stage 2:   eGFR  60-

89Stage 3:   eGFR   30-59Stage 4:   eGFR



                                        15-29Stage 5:   eGFR <15 (usually requir

ing dialysis)*Markers include: 

Proteinuria,



                                        Hematuria, abnormal imaging-studies, or 

other blood or urine test abnormalities











                    ID                  Date                Data Source

 

                    [id number]      2020 06:36:00 PM EDT [facility]

[facility]











          Name      Value     Range     Interpretation Description Data      Sup

porting



                                        Code                Source(s) Document(s

)

 

          AlcoholE  NON DETECTED           Normal (applies to Alcohol Ethyl, [facility] 



          thyl,Blo  None                non-numeric Blood     Health System 



          od        Detected            results)                      











                    ID                  Date                Data Source

 

                    [id number]      2020 06:36:00 PM EDT [facility]

[facility]











          Name      Value     Range     Interpretation Description Data Source(s

) Supporting



                                        Code                          Document(s

)

 

          Lipase    45 U/L              Normal (applies to Lipase, Serum [facility]

[facility] 



          [Enzymatic                     non-numeric           Health System 



          activity/vo                     results)                      



          lume] in                                                    



          Serum or                                                    



          Plasma                                                      











                    ID                  Date                Data Source

 

                    [id number]      2020 06:36:00 PM EDT [facility]

[facility]











          Name      Value     Range     Interpretation Description Data Source(s

) Supporting



                                        Code                          Document(s

)

 

          PCO2*     46.5                Normal (applies to PCO2*     [facility] 



                    {mm_Hg}             non-numeric           Health System 



                                        results)                      

 

          BaseExce  4.50                Above high normal Base Excess* [facility]

[facility] 



          ss*       {mEq/L}                                 [facility] System 

 

          PH*       7.406               Normal (applies to PH*       [facility] 



                    {pH_units           non-numeric           Health System 



                    }                   results)                      

 

          Chloride  100                 Normal (applies to Chloride, VB [facility]

[facility] 



          ,VB       mmol/L              non-numeric           Health System 



                                        results)                      

 

          Glucose,  102 mg/dL           Normal (applies to Glucose, VB [facility]

e 



          VB                            non-numeric           Health System 



                                        results)                      

 

          HCO3*     29.2                Above high normal HCO3*     [facility] 



                    mmol/L                                  [facility] 

 

          Lactate.  1.2                 Normal (applies to Lactate.  [facility] 



                    mmol/L              non-numeric           Health System 



                                        results)                      

 

          Potassiu  4.1                 Normal (applies to Potassium, VB [facility]

[facility] 



          m,VB      mmol/L              non-numeric           Health System 



                                        results)                      

 

          Sodium,V  138                 Normal (applies to Sodium, VB [facility]

 



          B         mmol/L              non-numeric           Health System 



                                        results)                      

 

          IonizedC  1.16                Normal (applies to Ionized   [facility] 



          alcium,V  mmol/L              non-numeric Calcium,  [facility] System 



          B                             results)                      

 

          K3Llyfdv  39.70 %             Normal (applies to O2 Saturation* [facility] 



          tion*                         non-numeric           [facility] System 



                                        results)                      











                    ID                  Date                Data Source

 

                    [id number]      2020 06:36:00 PM EDT [facility]

[facility]











          Name      Value     Range     Interpretation Description Data Source(s

) Supporting



                                        Code                          Document(s

)

 

          Troponin  0.00                Normal (applies to Troponin I [facility]

 



          IQuantit  ng/mL               non-numeric Quantitative - [facility] System

 



          ative-MV                      results)  MV Only             



          Only                                                        











                    ID                  Date                Data Source

 

                    [id number]      2020 06:36:00 PM EDT [facility]

[facility]











          Name      Value     Range     Interpretation Description Data      Sup

porting



                                        Code                Source(s) Document(s

)

 

          Amphetamine Negative            Normal (applies Amphetamine [facility]

 



          [Mass/volume]                     to non-numeric Level, Urine Health  

  



          in Urine                      results)            System    









                                        Cut-off = 1000 ng/mL









          Barbiturates Negative            Normal (applies to Barbiturate [facility] 



          [Mass/volume] in                     non-numeric Screen, Urine [facility] 

System 



          Urine by Screen                     results)                      



          method                                                      









                                        Cut-off = 200 ng/mL









          Cocaine   Negative            Normal (applies Cocaine   [facility] 



          metabolites.other                     to non-numeric Metabolite [facility]

 System 



          [Mass/volume] in Urine                     results)  Screen, Urine    

       









                                        Cut-off = 300 ng/mL









          Benzodiazepines Positive            Abnormal  Benzodiazepines, [facility]

[facility] 



          [Mass/volume] in                     (applies to Urine     Health Syst

em 



          Urine                         non-numeric                     



                                        results)                      









                                        Cut-off = 200 ng/mL









          [id number],Urine Negative            Normal (applies to Opiate 300, Mon

[facility] 



                                        non-numeric results) Urine     System   

 









                                        Cut-off = 300 ng/mL









          Methadone Negative            Normal (applies to Methadone Level, [facility] 



          [Mass/volume] in                     non-numeric Urine     System    



          Urine                         results)                      









                                        Cut-off = 300 ng/mL









           Cannabinoid(THC) Negative              Normal (applies to Cannabinoid

 (THC) [facility] 

[facility]                                  



                                        non-numeric           System    



                                        results)                      









                                        Cutt-off = 50These results are for medic

al treatment only. The positive findings

 are



                                        unconfirmed. Request confirmatory/quanti

tative test if needed.









          Phencyclidine Negative            Normal (applies Phencyclidine, Urine

 [facility] 



          [Mass/volume] in                     to non-numeric           Health S

ystem 



          Urine                         results)                      









                                        Cut-off = 25 ng/mL











                    ID                  Date                Data Source

 

                    [id number]      2020 06:36:00 PM EDT [facility]

[facility]











          Name      Value     Range     Interpretation Description Data      Sup

porting



                                        Code                Source(s) Document(s

)

 

          Lithium   <  0.101            Below low normal Lithium Level, [facility]

re 



          [Mass/volum                               Serum     Health System 



          e] in Serum                                                   



          or Plasma                                                   









                                        Performed At [facility], [address] [place] [address]











                    ID                  Date                Data Source

 

                    [id number]      2020 06:36:00 PM EDT [facility]

[facility]











          Name      Value     Range     Interpretation Description Data      Sup

porting



                                        Code                Source(s) Document(s

)

 

          Leukocytes 8.6                 Normal (applies WBC Count [facility] 



          [#/volume] in {10^3_uL            to non-numeric           Health    



          Unspecified }                   results)            System    



          specimen by                                                   



          Automated count                                                   

 

          Hemoglobin 15.1                Normal (applies Hemoglobin [facility] 



          [Mass/volume] in {gm/dL}             to non-numeric           Health  

  



          Blood                         results)            System    

 

          Erythrocytes 5.27                Normal (applies RBC Count [facility] 



          [#/volume] in {10^6_uL            to non-numeric           Health    



          Blood by  }                   results)            System    



          Automated count                                                   

 

          Erythrocyte mean 85.4 fl             Normal (applies MCV       [facility]

[facility] 



          corpuscular                     to non-numeric           Health    



          volume [Entitic                     results)            System    



          volume] by                                                   



          Automated count                                                   

 

          Hematocrit 45.0 %              Normal (applies Hematocrit [facility] 



          [Volume                       to non-numeric           Health    



          Fraction] of                     results)            System    



          Blood                                                       

 

          Erythrocyte mean 28.7 pg             Normal (applies MCH       [facility]

[facility] 



          corpuscular                     to non-numeric           Health    



          hemoglobin                     results)            System    



          [Entitic mass]                                                   



          by Automated                                                   



          count                                                       

 

          Erythrocyte 12.5 %              Normal (applies RDW-CV    [facility] 



          distribution                     to non-numeric           Health    



          width [Entitic                     results)            System    



          volume] by                                                   



          Automated count                                                   

 

          Erythrocyte mean 33.6                Normal (applies [facility]      [facility]

ore 



          corpuscular {gm/dL}             to non-numeric           Health    



          hemoglobin                     results)            System    



          concentration                                                   



          [Mass/volume] by                                                   



          Automated count                                                   

 

          Platelet mean 10.7 fl             Above high normal MPV       [facility]

re 



          volume [Entitic                                         Health    



          volume] in Blood                                         System    



          by Automated                                                   



          count                                                       

 

          Platelets 286                 Normal (applies Platelet  [facility] 



          [#/volume] in {10^3_uL            to non-numeric Count     Health    



          Plasma by }                   results)            System    



          Automated count                                                   











                    ID                  Date                Data Source

 

                    [id number]      2020 06:36:00 PM EDT [facility]

alth System











          Name      Value     Range     Interpretation Description Data      Sup

porting



                                        Code                Source(s) Document(s

)

 

          Human     NONREACTIVE           Normal ([facility] 



          immunodeficien Normal Range:           to non-numeric Routine   Health

    



          cy virus  Non                 results)  (antigen and System    



          antibody titer ReactiveNegativ                     antibody           

 



          measurement e for HIV-1                     testing)            



          (procedure) antigen and                                         



                    HIV-1/HIV-2                                         



                    antibodies.  No                                         



                    laboratory                                         



                    evidence of HIV                                         



                    infection.Test                                         



                    was performed                                         



                    at [address]                                           











                    ID                  Date                Data Source

 

                    [id number]      2020 06:36:00 PM EDT [facility]

alth System











          Name      Value     Range     Interpretation Description Data      Sup

porting



                                        Code                Source(s) Document(s

)

 

          Sodium    138                 Normal (applies Sodium, Serum [facility]

 



          [Moles/volume mmol/L              to non-numeric           Health Syst

em 



          ] in Serum or                     results)                      



          Plasma                                                      

 

          Potassium 4.5                 Normal (applies Potassium, [facility] 



          [Mass/volume] mmol/L              to non-numeric Serum     Health Syst

em 



          in Serum or                     results)                      



          Plasma                                                      

 

          Carbon    24.0                Normal (applies CO2, Serum [facility] 



          dioxide,  mmol/L              to non-numeric           Health System 



          total                         results)                      



          [Moles/volume                                                   



          ] in Serum or                                                   



          Plasma                                                      

 

          Glucose   86 mg/dL            Normal (applies Glucose, Serum [facility]

e 



          [Mass/volume]                     to non-numeric           Health Syst

em 



          in Serum or                     results)                      



          Plasma                                                      

 

          Chloride  104                 Normal (applies Chloride, [facility] 



          [Moles/volume mmol/L              to non-numeric Serum     Health Syst

em 



          ] in Serum or                     results)                      



          Plasma                                                      

 

          Creatinine 0.90                Normal (applies Creatinine, [facility] 



          [Mass/volume] mg/dl               to non-numeric Serum     Health Syst

em 



          in Serum or                     results)                      



          Plasma                                                      

 

          Calcium   9.3                 Normal (applies Calcium, Total [facility]

e 



          [Mass/volume] mg/dl               to non-numeric Serum     Health Syst

em 



          in Serum or                     results)                      



          Plasma                                                      

 

          Urea nitrogen 13 mg/dl            Normal (applies Blood Urea [facility]

e 



          [Mass/volume]                     to non-numeric Nitrogen, Health Syst

em 



          in Serum or                     results)  Serum               



          Plasma                                                      

 

          Anion gap in 10.00               Normal (applies Anion Gap [facility] 



          Serum or  mmol/L              to non-numeric           Health System 



          Plasma                        results)                      











                    ID                  Date                Data Source

 

                    [id number]      2020 06:36:00 PM EDT [facility]

alth System











          Name      Value     Range     Interpretation Description Data      Sup

porting



                                        Code                Source(s) Document(s

)

 

          Triglyceride 146                 Normal (applies Triglycerides, [facility]

[facility] 



          [Mass/volume] mg/dl               to non-numeric Serum     Health    



          in Serum or                     results)            System    



          Plasma                                                      









                                        Optimal = < 100 mg/dLBoderline High = 15

0 - 199 mg/dLHigh = 200 - 499 mg/dLVery 

High



                                        = > 500 mg/dL









          Cholesterol 165 mg/dl           Normal (applies Cholesterol, Serum [facility]

[facility] 



          [Mass/volume] in                     to non-numeric           Health S

ystem 



          Serum or Plasma                     results)                      









                                        <200 mg/dL = [id number] - 239 md/dL = 

Borderline>240 mg/dL = High Risk









          Cholesterol in HDL 38.0 mg/dL           Normal (applies HDL Cholestero

l, [facility] 



          [Mass/volume] in                     to non-numeric Serum     Health S

ystem 



          Serum or Plasma                     results)                      

 

          Cholesterol in LDL 97.8 mg/dL           Normal (applies Low Density Mo

[facility] 



          [Mass/volume] in                     to non-numeric Lipoprotein, Healt

h System 



          Serum or Plasma                     results)  Calculated           









                                        OPTIMAL: LESS THAN 100 mg/dLNEAR OPTIMAL

: 100 - 129 mg/dLBODERLINE HIGH: 130 - 

150



                                        mg/dL









          Cholesterol in VLDL 29.2                Normal (applies to VLDL, Serum

 [facility] 



          [Mass/volume] in Serum                     non-numeric results)       

    System    



          or Plasma                                                   

 

          CHDRisk   4.34                Normal (applies to CHD Risk  [facility] 

Health 



                                        non-numeric results)           System   

 











                    ID                  Date                Data Source

 

                    [id number]        03/15/2020 01:09:00 PM EDT [facility]

alth System











          Name      Value     Range     Interpretation Code Description Data Fide

rce(s) Supporting



                                                                      Document(s

)

 

          SARS-CoV-2                                         [facility] 



          RNA XXX Ql                                         Health System 



          RICHARD+probe                                                   









                                        This lab was ordered by [facility] a

nd reported by [facility].











                    ID                  Date                Data Source

 

                    [id number] 2020 06:36:00 AM ROZ Oliveira

[facility]



                    0500                                    











          Name      Value     Range     Interpretation Description Data      Sup

porting



                                        Code                Source(s) Document(s

)

 

          Hemoglobin           13.5-17.            <content  [facility]     



          [Mass/volume] in           5                   styleCode="[place]

   



          Blood                                   ">Hemoglobin Medical   



                                                  </content>14.3 Center    



                                                  G/DL<content           



                                                  styleCode="Ital           



                                                  ics">               



                                                  (13.5-17.5           



                                                  G/DL)</content>           

 

          Erythrocytes           4.4-5.9             <content  [facility]     



          [#/volume] in                               styleCode="[place] [place]   



          Blood by                                ">Red Blood Medical   



          Automated count                               Cell Count Center    



                                                  </content>4.87           



                                                  MCUMM<content           



                                                  styleCode="Ital           



                                                  ics"> (4.4-5.9           



                                                  MCUMM)</content           



                                                  >                   

 

          Leukocytes           4.4-11.0            <content  [facility]     



          [#/volume] in                               styleCode="[facility]   



          Blood by                                ">White Blood Medical   



          Automated count                               Cell Count Center    



                                                  </content>7.52           



                                                  KCUMM<content           



                                                  styleCode="Ital           



                                                  ics"> (4.4-11.0           



                                                  KCUMM)</content           



                                                  >                   

 

          Hematocrit           41.0-53.            <content  [facility]     



          [Volume             0                   styleCode="[facility]   



          Fraction] of                               ">Hematocrit Medical   



          Blood by                                </content>43.1 Center    



          Automated count                               %<content           



                                                  styleCode="Ital           



                                                  ics">               



                                                  (41.0-53.0           



                                                  %)</content>           

 

          Platelets           130-400             <content  [facility]     



          [#/volume] in                               styleCode="[facility] [place]   



          Blood by                                ">Platelet Medical   



          Automated count                               Count     Center    



                                                  </content>249           



                                                  KCUMM<content           



                                                  styleCode="Ital           



                                                  ics"> (130-400           



                                                  KCUMM)</content           



                                                  >                   

 

          Erythrocyte mean           26.0-34.            <content  [facility]     



          corpuscular           0                   styleCode="[place] [place]   



          hemoglobin                               ">Mean    Medical   



          [Entitic mass]                               Corposcular Center    



          by Automated                               Hemoglobin           



          count                                   </content>29.4           



                                                  PG<content           



                                                  styleCode="Ital           



                                                  ics">               



                                                  (26.0-34.0           



                                                  PG)</content>           

 

          Erythrocyte mean           32.0-37.            <content  [facility]     



          corpuscular           0                   styleCode="[place] [place]   



          hemoglobin                               ">Mean Corpus. Medical   



          concentration                               Hgb       Center    



          [Mass/volume] by                               Concentration          

 



          Automated count                               ([facility]CH[facility])              



                                                  </content>33.2           



                                                  G/DL<content           



                                                  styleCode="Ital           



                                                  ics">               



                                                  (32.0-37.0           



                                                  G/DL)</content>           

 

          Erythrocyte mean           80.0-100            <content  [facility]     



          corpuscular           .0                  styleCode="[place] [place]   



          volume [Entitic                               ">Mean    Medical   



          volume] by                               Corpuscular Center    



          Automated count                               Volume              



                                                  </content>88.5           



                                                  FL<content           



                                                  styleCode="Ital           



                                                  ics">               



                                                  (80.0-100.0           



                                                  FL)</content>           

 

          Erythrocyte           11.5-14.            <content  [facility]     



          distribution           5                   styleCode="[place]   



          width [Ratio] by                               ">Red Cell Medical   



          Automated count                               Distribution Center    



                                                  Width               



                                                  </content>12.3           



                                                  %<content           



                                                  styleCode="Ital           



                                                  ics">               



                                                  (11.5-14.5           



                                                  %)</content>           

 

          Platelet mean           8.0-11.0  Above high <content  [facility]     



          volume [Entitic                     normal    styleCode="[place] 

  



          volume] in Blood                               ">Mean Platelet Medical

   



          by Automated                               Volume    Center    



          count                                   </content>11.6           



                                                  FL H<content           



                                                  styleCode="Ital           



                                                  ics"> (8.0-11.0           



                                                  FL)</content>           

 

          UNK                 0.0                 <content  [facility]     



                                                  styleCode="[place]   



                                                  ">Nucleated Red Medical   



                                                  Blood Cell Center    



                                                  Count               



                                                  </content>0.00           



                                                  KCUMM<content           



                                                  styleCode="Ital           



                                                  ics"> (0.0           



                                                  KCUMM)</content           



                                                  >                   

 

          UNK                 0                   <content  [facility]     



                                                  styleCode="[place]   



                                                  ">Nucleated Red Medical   



                                                  Blood Cell Center    



                                                  </content>0.0           



                                                  /100<content           



                                                  styleCode="Ital           



                                                  ics"> (0            



                                                  /100)</content>           











                    ID                  Date                Data Source

 

                    [id number] 02/15/2020 12:35:00 AM EST [facility] 

Center











          Name      Value     Range     Interpretation Description Data      Sup

porting



                                        Code                Source(s) Document(s

)

 

          Triglyceride           < 150               <content  [facility]     



          [Mass/volume] in                               styleCode="[place] 

  



          Serum or Plasma                               d">Triglycerid Medical  

 



                                                  es        Center    



                                                  </content>122           



                                                  MG/DL<content           



                                                  styleCode="[place]           



                                                  lics"> (< 150           



                                                  MG/DL)</conten           



                                                  t>                  

 

          UNK                 < 100               <content  [facility]     



                                                  styleCode="[place]   



                                                  d">LDL-Cholest Medical   



                                                  satish      [place]    



                                                  </content>80           



                                                  MG/DL<content           



                                                  styleCode="[place]           



                                                  lics"> (< 100           



                                                  MG/DL)</conten           



                                                  t>                  

 

          Cholesterol           -<200               <content  [facility]     



          [Mass/volume] in                               styleCode="[place] [place] 

  



          Serum or Plasma                               d">Cholesterol Medical  

 



                                                  </content>146 Center    



                                                  MG/DL<content           



                                                  styleCode="[place]           



                                                  lics"> (-<200           



                                                  MG/DL)</conten           



                                                  t>                  

 

          UNK                 > 60      Below low normal <content  [facility]     



                                                  styleCode="[place]   



                                                  d">HDL-   Medical   



                                                  Cholesterol [place]    



                                                  </content>42           



                                                  MG/DL               



                                                  L<content           



                                                  styleCode="[place]           



                                                  lics"> (> 60           



                                                  MG/DL)</conten           



                                                  t>                  











                    ID                  Date                Data Source

 

                    [id number] 02/15/2020 12:35:00 AM [facility]



                    -0500                                   











          Name      Value     Range     Interpretation Code Description Data Fide

rce(s) Supporting



                                                                      Document(s

)

 

          UNK                 4.2-5.8             <content  [facility] 



                                                  styleCode="[facility]

r 



                                                  >Hemoglobin A1C           



                                                  </content>5.7           



                                                  %<content           



                                                  styleCode="[place]           



                                                  cs"> (4.2-5.8           



                                                  %)</content>           











                    ID                  Date                Data Source

 

                    [id number] 2020 01:30:00 AM [facility]



                    0                                       











          Name      Value     Range     Interpretation Description Data      Sup

porting



                                        Code                Source(s) Document(s

)

 

          Color of Urine           YELLOW              <content  [facility]     



                                                  styleCode="[place] [place]   



                                                  d">Color, Medical   



                                                  Urine     Center    



                                                  </content>YELL           



                                                  OW <content           



                                                  styleCode="[place]           



                                                  lics"> (YELLOW           



                                                  )</content>           

 

          UNK                 CLEAR               <content  [facility]     



                                                  styleCode="[place] [place]   



                                                  d">Urine  Medical   



                                                  Clarity   Center    



                                                  </content>LOIDA           



                                                  R <content           



                                                  styleCode="[place]           



                                                  lics"> (CLEAR           



                                                  )</content>           

 

          Ketones             NEGATIVE            <content  [facility]     



          [Mass/volume]                               styleCode="[facility]   



          in Urine by                               d">Urine  Medical   



          Test strip                               Ketone    Center    



                                                  </content>NEGA           



                                                  TIVE                



                                                  MG/DL<content           



                                                  styleCode="[place]           



                                                  lics">              



                                                  (NEGATIVE           



                                                  MG/DL)</conten           



                                                  t>                  

 

          UNK                 NEGATIVE            <content  [facility]     



                                                  styleCode="[facility] [place]   



                                                  d">Urine  Medical   



                                                  Bilirubin Center    



                                                  </content>NEGA           



                                                  TIVE <content           



                                                  styleCode="[place]           



                                                  lics">              



                                                  (NEGATIVE           



                                                  )</content>           

 

          Specific            1.015-1.02 Below low normal <content  [facility]     



          gravity of           5                   styleCode="[facility]   



          Urine by Test                               d">Urine  Medical   



          strip                                   Specific  Center    



                                                  Gravity             



                                                  </content>1.01           



                                                  0  L<content           



                                                  styleCode="[place]           



                                                  lics">              



                                                  (1.015-1.025           



                                                  )</content>           

 

          Glucose             NEGATIVE            <content  [facility]     



          [Mass/volume]                               styleCode="[facility] [place]   



          in Urine by                               d">Urine  Medical   



          Test strip                               Glucose   Center    



                                                  </content>NEGA           



                                                  TIVE                



                                                  MG/DL<content           



                                                  styleCode="[place]           



                                                  lics">              



                                                  (NEGATIVE           



                                                  MG/DL)</conten           



                                                  t>                  

 

          Hemoglobin           NEGATIVE            <content  [facility]     



          [Presence] in                               styleCode="[facility]   



          Urine by Test                               d">Urine Blood Medical   



          strip                                   </content>NEGA Center    



                                                  TIVE <content           



                                                  styleCode="[place]           



                                                  lics">              



                                                  (NEGATIVE           



                                                  )</content>           

 

          Nitrite             NEGATIVE            <content  [facility]     



          [Presence] in                               styleCode="[facility]   



          Urine by Test                               d">Urine  Medical   



          strip                                   Nitrite   Center    



                                                  </content>NEGA           



                                                  TIVE <content           



                                                  styleCode="[place]           



                                                  lics">              



                                                  (NEGATIVE           



                                                  )</content>           

 

          Protein             NEGATIVE            <content  [facility]     



          [Mass/volume]                               styleCode="[facility] [place]   



          in Urine by                               d">Urine  Medical   



          Test strip                               Protein   Center    



                                                  </content>NEGA           



                                                  TIVE                



                                                  MG/DL<content           



                                                  styleCode="[place]           



                                                  lics">              



                                                  (NEGATIVE           



                                                  MG/DL)</conten           



                                                  t>                  

 

          pH of Urine by           4.5-8.0             <content  [facility]     



          Test strip                               styleCode="[facility] [place]   



                                                  d">Urine pH Medical   



                                                  </content>6.0 Center    



                                                  <content            



                                                  styleCode="[place]           



                                                  lics">              



                                                  (4.5-8.0            



                                                  )</content>           

 

          Urobilinogen           0.2-1.0             <content  [facility]     



          [Units/volume]                               styleCode="[place] [place]   



          in Urine by                               d">Urine  Medical   



          Test strip                               Urobilinogen Center    



                                                  </content>0.2           



                                                  MG/DL<content           



                                                  styleCode="[place]           



                                                  lics">              



                                                  (0.2-1.0            



                                                  MG/DL)</conten           



                                                  t>                  

 

          Leukocyte           NEGATIVE            <content  [facility]     



          esterase                                styleCode="[place] [place]   



          [Presence] in                               d">Urine  Medical   



          Urine by Test                               Leukocyte Center    



          strip                                   </content>NEGA           



                                                  TIVE <content           



                                                  styleCode="[place]           



                                                  lics">              



                                                  (NEGATIVE           



                                                  )</content>           











                    ID                  Date                Data Source

 

                    [id number] 2020 01:30:00 AM [facility]



                    -0500                                   











          Name      Value     Range     Interpretation Description Data      Sup

porting



                                        Code                Source(s) Document(s

)

 

          Cannabinoids                               <content  [facility]     



          [Presence] in                               styleCode="[facility]   



          Urine by Screen                               d">Cannabinoid Medical  

 



          method >50 ng/mL                               s         Center    



                                                  </content>NEGA           



                                                  TIVE NG/ML           



                                                  (Reference           



                                                  Range: not           



                                                  available)<br/           



                                                  >                   











                    ID                  Date                Data Source

 

                    [id number] 2020 01:25:00 AM [facility]



                    0500                                    











          Name      Value     Range     Interpretation Description Data      Sup

porting



                                        Code                Source(s) Document(s

)

 

          Hemoglobin           13.5-17.            <content  [facility]     



          [Mass/volume] in           5                   styleCode="[place] [place]

   



          Blood                                   ">Hemoglobin Medical   



                                                  </content>15.4 Center    



                                                  G/DL<content           



                                                  styleCode="[facility]           



                                                  ics">               



                                                  (13.5-17.5           



                                                  G/DL)</content>           

 

          Erythrocytes           4.4-5.9             <content  [facility]     



          [#/volume] in                               styleCode="[place]   



          Blood by                                ">Red Blood Medical   



          Automated count                               Cell Count Center    



                                                  </content>5.22           



                                                  [facility]<content           



                                                  styleCode="[facility]           



                                                  ics"> (4.4-5.9           



                                                  [facility])</content           



                                                  >                   

 

          Leukocytes           4.4-11.0  Above high <content  [facility]     



          [#/volume] in                     normal    styleCode="[place]   



          Blood by                                ">White Blood Medical   



          Automated count                               Cell Count Center    



                                                  </content>15.43           



                                                  [facility] H<content           



                                                  styleCode="[facility]           



                                                  ics"> (4.4-11.0           



                                                  [facility])</content           



                                                  >                   

 

          Platelets           130-400             <content  [facility]     



          [#/volume] in                               styleCode="[place]   



          Blood by                                ">Platelet Medical   



          Automated count                               Count     Center    



                                                  </content>273           



                                                  [facility]<content           



                                                  styleCode="[facility]           



                                                  ics"> (130-400           



                                                  [facility])</content           



                                                  >                   

 

          Hematocrit           41.0-53.            <content  [facility]     



          [Volume             0                   styleCode="[place]   



          Fraction] of                               ">Hematocrit Medical   



          Blood by                                </content>45.0 Center    



          Automated count                               %<content           



                                                  styleCode="[facility]           



                                                  ics">               



                                                  (41.0-53.0           



                                                  %)</content>           

 

          Erythrocyte           11.5-14.            <content  [facility]     



          distribution           5                   styleCode="[place]   



          width [Ratio] by                               ">Red Cell Medical   



          Automated count                               Distribution Center    



                                                  Width               



                                                  </content>12.6           



                                                  %<content           



                                                  styleCode="[facility]           



                                                  ics">               



                                                  (11.5-14.5           



                                                  %)</content>           

 

          Erythrocyte mean           32.0-37.            <content  [facility]     



          corpuscular           0                   styleCode="[place]   



          hemoglobin                               ">Mean Corpus. Medical   



          concentration                               Hgb       Center    



          [Mass/volume] by                               Concentration          

 



          Automated count                               (MCHC)              



                                                  </content>34.2           



                                                  G/DL<content           



                                                  styleCode="[facility]           



                                                  ics">               



                                                  (32.0-37.0           



                                                  G/DL)</content>           

 

          Erythrocyte mean           80.0-100            <content  [facility]     



          corpuscular           .0                  styleCode="[place]   



          volume [Entitic                               ">Mean    Medical   



          volume] by                               Corpuscular Center    



          Automated count                               Volume              



                                                  </content>86.2           



                                                  FL<content           



                                                  styleCode="[facility]           



                                                  ics">               



                                                  (80.0-100.0           



                                                  FL)</content>           

 

          Erythrocyte mean           26.0-34.            <content  [facility]     



          corpuscular           0                   styleCode="[place]   



          hemoglobin                               ">Mean    Medical   



          [Entitic mass]                               Corposcular Center    



          by Automated                               Hemoglobin           



          count                                   </content>29.5           



                                                  PG<content           



                                                  styleCode="[facility]           



                                                  ics">               



                                                  (26.0-34.0           



                                                  PG)</content>           

 

          UNK                 0.0                 <content  [facility]     



                                                  styleCode="[place]   



                                                  ">Nucleated Red Medical   



                                                  Blood Cell Center    



                                                  Count               



                                                  </content>0.00           



                                                  [facility]<content           



                                                  styleCode="[facility]           



                                                  ics"> (0.0           



                                                  [facility])</content           



                                                  >                   

 

          UNK                 0                   <content  [facility]     



                                                  styleCode="[place]   



                                                  ">Nucleated Red Medical   



                                                  Blood Cell Center    



                                                  </content>0.0           



                                                  /100<content           



                                                  styleCode="[facility]           



                                                  ics"> (0            



                                                  /100)</content>           

 

          Platelet mean           8.0-11.0  Above high <content  [facility]     



          volume [Entitic                     normal    styleCode="[place] 

  



          volume] in Blood                               ">Mean Platelet Medical

   



          by Automated                               Volume    Center    



          count                                   </content>11.4           



                                                  FL H<content           



                                                  styleCode="[facility]           



                                                  ics"> (8.0-11.0           



                                                  FL)</content>           











                    ID                  Date                Data Source

 

                    GFR(Creatinine).[id number] 2020 01:25:00 AM [facility]



                    0-0500                                  











          Name      Value     Range     Interpretation Code Description Data Fide

rce(s) Supporting



                                                                      Document(s

)

 

          UNK                 > 60                <content  [facility] 



                                                  styleCode="[facility]"> Medical Cent

er 



                                                  EGFR                



                                                  </content>130           



                                                  GFR<content           



                                                  styleCode="[facility]           



                                                  s"> (> 60           



                                                  GFR)</content>           











                    ID                  Date                Data Source

 

                    CardiacMarkers.[id number] 2020 01:25:00 AM [facility]



                    -0500                                   











          Name      Value     Range     Interpretation Description Data      Sup

porting



                                        Code                Source(s) Document(s

)

 

          Troponin            < 0.034             <content  [facility]     



          I.cardiac                               styleCode="[place]   



          [Mass/volume                               ">Troponin I Medical   



          ] in Serum                               </content>< Center    



          or Plasma                               0.012               



                                                  NG/ML<content           



                                                  styleCode="[facility]           



                                                  ics"> (< 0.034           



                                                  NG/ML)</content           



                                                  >                   











                    ID                  Date                Data Source

 

                    [id number] 2020 01:25:00 AM EST [facility]











          Name      Value     Range     Interpretation Description Data      Sup

porting



                                        Code                Source(s) Document(s

)

 

          UNK                 9-20                <content  [facility]     



                                                  styleCode="[place] [place]   



                                                  d">BUN    Medical   



                                                  </content>16 Center    



                                                  MG/DL<content           



                                                  styleCode="[place]           



                                                  lics"> (9-20           



                                                  MG/DL)</conten           



                                                  t>                  

 

          Sodium              137-145             <content  [facility]     



          [Moles/volume]                               styleCode="[place] [place]   



          in Serum or                               d">Sodium Medical   



          Plasma                                  </content>139 Center    



                                                  MEQ/L<content           



                                                  styleCode="[place]           



                                                  lics">              



                                                  (137-145            



                                                  MEQ/L)</conten           



                                                  t>                  

 

          Potassium           3.5-5.3             <content  [facility]     



          [Moles/volume]                               styleCode="[facility] [place]   



          in Serum or                               d">Potassium Medical   



          Plasma                                  </content>3.9 Center    



                                                  MEQ/L<content           



                                                  styleCode="[place]           



                                                  lics">              



                                                  (3.5-5.3            



                                                  MEQ/L)</conten           



                                                  t>                  

 

          Carbon              22-30               <content  [facility]     



          dioxide, total                               styleCode="Chad Andrewss   



          [Moles/volume]                               d">[facility]   



          in Serum or                               Dioxide   Center    



          Plasma                                  </content>25           



                                                  MEQ/L<content           



                                                  styleCode="[place]           



                                                  lics"> (22-30           



                                                  MEQ/L)</conten           



                                                  t>                  

 

          Chloride                          <content  [facility]     



          [Moles/volume]                               styleCode="[facility] [place]   



          in Serum or                               d">Chloride Medical   



          Plasma                                  </content>103 Center    



                                                  MEQ/L<content           



                                                  styleCode="[place]           



                                                  lics"> (           



                                                  MEQ/L)</conten           



                                                  t>                  

 

          Calcium             8.4-10.2            <content  [facility]     



          [Mass/volume]                               styleCode="[facility]ad Andrewss   



          in Serum or                               d">Calcium Medical   



          Plasma                                  </content>9.4 Center    



                                                  MG/DL<content           



                                                  styleCode="[place]           



                                                  lics">              



                                                  (8.4-10.2           



                                                  MG/DL)</conten           



                                                  t>                  

 

          UNK                 > 60                <content  [facility]     



                                                  styleCode="[facility] [place]   



                                                  d">EGFR   Medical   



                                                  </content>130 Center    



                                                  GFR<content           



                                                  styleCode="[place]           



                                                  lics"> (> 60           



                                                  GFR)</content>           

 

          Creatinine           0.5-1.3             <content  [facility]     



          [Mass/volume]                               styleCode="Chad Andrewss   



          in Serum or                               d">Creatinine Medical   



          Plasma                                  </content>0.7 Center    



                                                  MG/DL<content           



                                                  styleCode="[place]           



                                                  lics">              



                                                  (0.5-1.3            



                                                  MG/DL)</conten           



                                                  t>                  

 

          Glucose                           <content  [facility]     



          [Mass/volume]                               styleCode="Chad Andrewss   



          in Serum or                               d">Glucose Medical   



          Plasma                                  </content>104 Center    



                                                  MG/DL<content           



                                                  styleCode="[place]           



                                                  lics"> (           



                                                  MG/DL)</conten           



                                                  t>                  











                    ID                  Date                Data Source

 

                    [id number]     1799 11:59:58 PM -04:56:02 [facility]

iagn[facility]











          Name      Value     Range     Interpretation Code Description Data Fide

rce(s) Supporting



                                                                      Document(s

)

 

          SARS-COV-2                                         [facility]     



          RNA RESP                                          Diagnostics 



          QL                                                          



          RICHARD+PROBE                                                   









                                        This lab was ordered by MIGUEL braswell nd reported by RAHEEL NDIAYE.









                                        Procedure

 

                                        







Social History







           Code       Duration   Value      Status     Description Data Source(s

)

 

           Smoking    02/15/2020 Denies Ever completed  Denies Ever Smoked [facility]

 [place]



                      06:04:00 PM EST Smoked                           Medical C

enter

 

           Smoking    02/15/2020 Daily Smoker completed  Daily Smoker [facility]

[facility]



                      03:40:00 AM EST                                  [facility]

enter

 

           Smoking    2020 Daily Smoker completed  Daily Smoker [facility]

[facility]



                      10:52:00 PM EST                                  [facility]

enter

 

           Smoking    2020 Daily Smoker completed  Daily Smoker [facility]

[facility]



                      09:22:00 PM EST                                  Medical C

enter

 

           Smoking    2020 Daily Smoker completed  Daily Smoker [facility]

[facility]



                      07:00:00 AM EST                                  [facility]

enter

 

           Smoking    2020 Daily Smoker completed  Daily Smoker [facility]

[facility]



                      01:08:00 AM EST                                  Medical C

enter

 

           Smoking    2020 Daily Smoker completed  Daily Smoker [facility]

[facility]



                      12:51:00 AM EST                                  Medical C

enter







Vital Signs







                    ID                  Date                Data Source

 

                    UNK                                     











           Name       Value      Range      Interpretation Code Description Data

 Source(s)

 

           Diastolic blood 68 mm[Hg]  0 - 999    Normal (applies to 68 mm[Hg]  

[facility]



           pressure                         non-numeric            [facility]



                                            results)              

 

           Systolic blood 107 mm[Hg] 0 - 999    Below low normal 107 mm[Hg] [facility]

efiore



           pressure                                               Health System

 

           Respiratory rate 16         0 - 999    Normal (applies to 16         

[facility]



                                            non[facility]numeric            [facility]



                                            results)              

 

           Heart rate 64         0 - 999    Normal (applies to 64         [facility]

[facility]



                                            non-numeric            Health System



                                            results)              

 

           Body temperature 36.1 [place]   0 - 99.9   Below low normal 36.1 [place]   [facility]



                                                                  [facility]

 

           Body temperature 97 [degF]  0 - 200    Normal (applies to 97 [degF]  

[facility]



                                            non-numeric            Health System



                                            results)              

 

           Body mass index 29.8 kg/m2                       29.8 kg/m2 [facility]

e



           (BMI) [Ratio]                                             Health Syst

em

 

           Body surface area 2 m2                             2 m2       [facility]

ore



           Derived from                                             Health Syste

m



           formula                                                

 

           Body weight 91.62 kg                         91.62 kg   [facility]



                                                                  Health System

 

           Body height 175.26 cm                        175.26 cm  [facility]

 

           Oxygen saturation 98 %       0 - 999    Normal (applies to 98 %      

 [facility]



           in Arterial blood                       [facility]            Health

 System



           by Pulse oximetry                       results)              

 

           Heart rate 81 /min                          81 /min    [facility]

 

           Diastolic blood 84 mm[Hg]                        84 mm[Hg]  [facility]                                               [facility]

 

           Systolic blood 118 mm[Hg]                       118 mm[Hg] [facility] pressure                                               [facility]

 

           Body temperature 36.828974                        36.274110 [place] [facility]

 

           Respiratory rate 19 /min                          19 /min    [facility]

 

           Heart rate 64 /min                          64 /min    [facility]

 

           Diastolic blood 79 mm[Hg]                        79 mm[Hg]  [facility] pressure                                               [facility] Center

 

           Systolic blood 123 mm[Hg]                       123 mm[Hg] [facility]

 

           Body temperature 36.789072                        36.285952 [place] [facility]

 

           Respiratory rate 20 /min                          20 /min    [facility]

 

           Heart rate 62 /min                          62 /min    [facility]

 

           Diastolic blood 68 mm[Hg]                        68 mm[Hg]  [facility] pressure                                               [facility] [place]

 

           Systolic blood 107 mm[Hg]                       107 mm[Hg] [facility] pressure                                               [facility]

 

           Body weight 91.051453                        91.788662 kg [facility]

hs



           Measured   kg                                          Medical Center

 

           Heart rate 81 /min                          81 /min    [facility]

 

           Diastolic blood 76 mm[Hg]                        76 mm[Hg]  [facility] pressure                                               [facility] Center

 

           Systolic blood 116 mm[Hg]                       116 mm[Hg] [facility]

 

           Body temperature 36.418613                        36.685921 [facility]

 

           Respiratory rate 19 /min                          19 /min    [facility]

 

           Heart rate 64 /min                          64 /min    [facility]

 

           Diastolic blood 75 mm[Hg]                        75 mm[Hg]  [facility] pressure                                               [facility] Center

 

           Systolic blood 120 mm[Hg]                       120 mm[Hg] [facility]

 

           Body temperature 36.269411                        36.517800 [place] [facility]

 

           Respiratory rate 18 /min                          18 /min    [facility]

 

           Body temperature 36.417555                        36.830642 [place] [facility]

 

           Respiratory rate 20 /min                          20 /min    [facility]

 

           Body weight 90.059440                        90.061429 kg [facility]

hs



           Measured   kg                                          Medical Center

 

           Body weight 93.039202                        93.383686 kg [facility]

hs



           Measured   kg                                          Medical Center

 

           Body weight 91.917291                        91.362680 kg [facility]

hs



           Measured   kg                                          Medical Center

 

           Body height 175.296163                       175.799425 cm [facility]

 

           Body mass index 29.95 kg/m2                       29.95 kg/m2 [facility]

[facility]ph[facility]



           (BMI) [Ratio]                                             Medical [facility]

ter

 

           Oxygen saturation 95 %                             95 %       [facility]

[facility]ph[facility]



           in Arterial blood                                             Medical

 [place]



           by Pulse oximetry                                             

 

           Body temperature 36.557314                        36.347940 [facility]

 

           Respiratory rate 16 /min                          16 /min    [facility]

 

           Heart rate 74 /min                          74 /min    [facility]

 

           Diastolic blood 62 mm[Hg]                        62 mm[Hg]  [facility]

 

           Systolic blood 110 mm[Hg]                       110 mm[Hg] [facility]

 

           Oxygen saturation 98 %                             98 %       [facility]

[facility]ph[facility]



           in [facility] blood                                             [facility]



           by Pulse oximetry                                             

 

           Respiratory rate 16 /min                          16 /min    [facility]

 

           Heart rate 72 /min                          72 /min    [facility]

 

           Diastolic blood 82 mm[Hg]                        82 mm[Hg]  [facility]

 

           Systolic blood 129 mm[Hg]                       129 mm[Hg] [facility]

 

           Oxygen saturation 95 %                             95 %       [facility]

oseph[facility]



           in [facility] blood                                             [facility]



           by Pulse oximetry                                             

 

           Body temperature 36.163938                        36.165927 [facility]

 

           Respiratory rate 18 /min                          18 /min    [facility]

 

           Heart rate 70 /min                          70 /min    [facility]

 

           Diastolic blood 89 mm[Hg]                        89 mm[Hg]  [facility]

 

           Systolic blood 146 mm[Hg]                       146 mm[Hg] [facility]

 

           Oxygen saturation 98 %                             98 %       [facility]

[facility]ph[facility]



           in [facility] blood                                             [facility]



           by Pulse oximetry                                             

 

           Body temperature 36.034562                        36.899330 Cel [facility]

 

           Respiratory rate 18 /min                          18 /min    [facility]

 

           Heart rate 69 /min                          69 /min    [facility]

 

           Diastolic blood 55 mm[Hg]                        55 mm[Hg]  [facility]

 

           Systolic blood 131 mm[Hg]                       131 mm[Hg] [facility]

 

           Oxygen saturation 96 %                             96 %       [facility]

osephs



           in [facility] blood                                             [facility]



           by Pulse oximetry                                             

 

           Body temperature 37.063311                        37.666978 Cel [facility]

 

           Respiratory rate 17 /min                          17 /min    [facility]

 

           Heart rate 78 /min                          78 /min    [facility]

 

           Diastolic blood 68 mm[Hg]                        68 mm[Hg]  [facility]



           pressure                                               [facility] Center

 

           Systolic blood 118 mm[Hg]                       118 mm[Hg] [facility]



           pressure                                               [facility] Center

 

           Body temperature 36.344894                        36.414271 [place] [facility]

 

           Body weight 85.717959                        85.593578 kg [facility]

hs



           Measured   kg                                          [facility]

 

           Body height 170.131659                       170.243666 cm [facility]



                      cm                                          Medical Center

 

           Body mass index 29.3 kg/m2                       29.3 kg/m2 [facility]



           (BMI) [Ratio]                                             Medical [facility]

ter







Patient Treatment Plan of Care







             Planned Activity Planned Date Details      Description  Data Source

(s)

 

             24 HR Nicotine 0.583 2020 03:43:28                           

[facility]



             MG/HR Transdermal Patch PM EDT                                 Syst

em

 

             aripiprazole 5 MG Oral 2020 03:43:03                         

  [facility]



             Tablet       PM EDT                                 System

 

             Sertraline 50 MG Oral 2020 03:42:53                          

 [facility]



             Tablet       PM EDT                                 System

 

             Lithium Carbonate 300 MG 2018 02:04:14                       

    [facility]



             Oral Capsule PM EDT                                 System

 

             aripiprazole 5 MG Oral 2018 02:03:57                         

  [facility]



             Tablet       PM EDT                                 System

 

             aripiprazole 15 MG Oral                                        [facility]

 

             Melatonin 3 MG Oral                                        [facility]

 

             Sertraline 100 MG Oral                                        [facility]

## 2020-10-03 LAB
ALBUMIN SERPL-MCNC: 3.5 G/DL (ref 3.4–5)
ALP SERPL-CCNC: 101 U/L (ref 45–117)
ALT SERPL-CCNC: 39 U/L (ref 13–61)
AMPHET UR-MCNC: NEGATIVE NG/ML
ANION GAP SERPL CALC-SCNC: 8 MMOL/L (ref 8–16)
AST SERPL-CCNC: 18 U/L (ref 15–37)
BARBITURATES UR-MCNC: NEGATIVE NG/ML
BASOPHILS # BLD: 1 % (ref 0–2)
BENZODIAZ UR SCN-MCNC: POSITIVE NG/ML
BILIRUB SERPL-MCNC: 1 MG/DL (ref 0.2–1)
BUN SERPL-MCNC: 16.1 MG/DL (ref 7–18)
CALCIUM SERPL-MCNC: 8.4 MG/DL (ref 8.5–10.1)
CHLORIDE SERPL-SCNC: 104 MMOL/L (ref 98–107)
CO2 SERPL-SCNC: 25 MMOL/L (ref 21–32)
COCAINE UR-MCNC: NEGATIVE NG/ML
CREAT SERPL-MCNC: 0.9 MG/DL (ref 0.55–1.3)
DEPRECATED RDW RBC AUTO: 13.6 % (ref 11.9–15.9)
EOSINOPHIL # BLD: 2.2 % (ref 0–4.5)
GLUCOSE SERPL-MCNC: 87 MG/DL (ref 74–106)
HCT VFR BLD CALC: 41.6 % (ref 35.4–49)
HGB BLD-MCNC: 13.9 GM/DL (ref 11.7–16.9)
LYMPHOCYTES # BLD: 10 % (ref 8–40)
MCH RBC QN AUTO: 29.4 PG (ref 25.7–33.7)
MCHC RBC AUTO-ENTMCNC: 33.5 G/DL (ref 32–35.9)
MCV RBC: 87.7 FL (ref 80–96)
METHADONE UR-MCNC: NEGATIVE NG/ML
MONOCYTES # BLD AUTO: 11.6 % (ref 3.8–10.2)
NEUTROPHILS # BLD: 75.2 % (ref 42.8–82.8)
OPIATES UR QL SCN: NEGATIVE NG/ML
PCP UR QL SCN: NEGATIVE NG/ML
PLATELET # BLD AUTO: 253 K/MM3 (ref 134–434)
PLATELET BLD QL SMEAR: NORMAL
PMV BLD: 9.5 FL (ref 7.5–11.1)
POTASSIUM SERPLBLD-SCNC: 4 MMOL/L (ref 3.5–5.1)
PROT SERPL-MCNC: 7.1 G/DL (ref 6.4–8.2)
RBC # BLD AUTO: 4.74 M/MM3 (ref 4–5.6)
SODIUM SERPL-SCNC: 137 MMOL/L (ref 136–145)
WBC # BLD AUTO: 11.1 K/MM3 (ref 4–10)

## 2020-10-03 PROCEDURE — HZ2ZZZZ DETOXIFICATION SERVICES FOR SUBSTANCE ABUSE TREATMENT: ICD-10-PCS

## 2020-10-03 RX ADMIN — Medication SCH MG: at 10:19

## 2020-10-03 RX ADMIN — SODIUM CHLORIDE SCH: 9 INJECTION, SOLUTION INTRAVENOUS at 05:49

## 2020-10-03 RX ADMIN — ENOXAPARIN SODIUM SCH MG: 40 INJECTION SUBCUTANEOUS at 17:26

## 2020-10-03 RX ADMIN — ACETAMINOPHEN PRN MG: 325 TABLET ORAL at 17:24

## 2020-10-03 RX ADMIN — FOLIC ACID SCH MG: 1 TABLET ORAL at 10:19

## 2020-10-03 RX ADMIN — SODIUM CHLORIDE SCH MLS/HR: 9 INJECTION, SOLUTION INTRAVENOUS at 06:00

## 2020-10-03 RX ADMIN — LITHIUM CARBONATE SCH MG: 300 CAPSULE, GELATIN COATED ORAL at 21:49
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
## 2020-10-03 NOTE — XMS
Summarization Of Episode

                           Created on:October 3, 2020



Patient:BENJIE AGUSTIN

Sex:Male

:1975

External Reference #:[id number]





Demographics







                          Address                   [place]



                                                    [place] [address]

 

                          Home Phone                [phone]

 

                          Email Address             [email or web address]

 

                          Preferred Language        en

 

                          Marital Status            Not  or 

 

                          Zoroastrian Affiliation     [place]

 

                          Race                      WH

 

                          Ethnic Group              Not  or 









Author







                          Organization              [facility]









Support







                Name            Relationship    Address         Phone

 

                UE              Unavailable     Unavailable     Unavailable

 

                BENJIE AGUSTIN SELF / SAME AS PATIENT HOMELESS        [phone]



                                                [place] [address] 

 

                NONE, NONE      3               Unavailable     [phone]

 

                UNEMPLOYED      Unavailable     HOMESLESS AS PER PATIENT Unavail

able



                                                [place] [address] 

 

                ANGELINA MEHTA OT              Unavailable     [phone]

 

                BENJIE AGUSTIN            HOMELESS        Unavailable



                                                [place] [address] 









Care Team Providers







                    Name                Role                Phone

 

                    AMEE FUENTESZACHMAIRA Unavailable         Marshall Pelletier MD Unavailable         Unavailable

 

                    MADELEINE Sierra MD     Unavailable         Unavailable

 

                    ED STAFF PHYSICIAN  Unavailable         Unavailable

 

                    EMEKA ALLEN Unavailable         Unavailable

 

                    Sherman             Unavailable         Unavailable









Re-disclosure Warning

The records that you are about to access may contain information from federally-
assisted alcohol or drug abuse programs. If such information is present, then 
the following federally mandated warning applies: This information has been 
disclosed to you from records protected by federal confidentiality rules (42 CFR
part 2). The federal rules prohibit you from making any further disclosure of 
this information unless further disclosure is expressly permitted by the written
consent of the person to whom it pertains or as otherwise permitted by 42 CFR 
part 2. A general authorization for the release of medical or other information 
is NOT sufficient for this purpose. The Federal rules restrict any use of the 
information to criminally investigate or prosecute any alcohol or drug abuse 
patient.The records that you are about to access may contain highly sensitive 
health information, the redisclosure of which is protected by Article 27-F of 
the [facility] Public Health law. If you continue you may haveaccess to 
information: Regarding HIV / AIDS; Provided by facilities licensed or operated 
by the [facility] Office of Mental Health; or Provided by the [facility]
Office for People With Developmental Disabilities. If such information is 
present, then the following [place] mandated warning applies: This 
information has been disclosed to you from confidential records which are 
protected by state law. State law prohibits you from making any further 
disclosure of this information without the specific written consent of the 
person to whom it pertains, or as otherwise permitted by law. Any unauthorized 
further disclosure in violation of state law may result in a fine or MCC 
sentence or both. A general authorization for the release of medical or other 
information is NOT sufficient authorization for further disclosure.



Encounters







           Encounter  Providers  Location   Date       Indications Data Source(s

)

 

           Inpatient  Attender: Marshall [id number]      2020 [facility]

 - Marina Sierra              06:09:00 PM DISORDER   [facility]

al



                      MDAttender:            EDT -                 



                      [facility]               2020            



                      EllisonAdmitter:            05:00:00 PM            



                      Marshall Sierra            EDT                   



                      MD                                          









                                        SCHIZOAFFECTIVE DISORDER

 

                                        Patient discharged.









           Inpatient  Attender: ARSH GUADALUPE H-HAL2     2020 12:39:

00            [facility]



                      ARSH RAttender: STAFF            AM EST - 

[address] [place] [address]



                      ED STAFF PHYSICIANAdmitter:            01:27:00 PM EDT    

        



                      ARSH CABAN RReferrer:                                  



                      ARSH CABAN R                                  









                                        Patient discharged.









           Inpatient  Attender: MAIRA LUBIN H-HAL6     2018 07:42

:00            [facility]



                      ALFREDOERAdmitter:            AM EDT - 2018       

     [facility]



                      MAIRA LUBIN            01:35:00 PM EDT           

 



                      PREMOPHERReferrer:                                  



                      MAIRA RAO                                  







Medications







       Medication Brand  Start  Product Dose   Route  Administrative Pharmacy [facility] 

Indications         Reaction            Description         Data



          Name Date Form           Instructions Instructions                    

 Source(s)

 

     24 HR nicoti     [id number]           active           Nicoderm C-

Q [facility]



     Nicotine ne 2020      [facility]                               [facility]



     0.583 MG/HR mg/24 03:43:      H}                                      Syste

m



     Transdermal hr   28 PM                                              



     Patch transd EDT                                               



     nicotine 14 ermal                                                   



     mg/24 hr film,                                                   



     transdermal extend                                                   



     film, ed                                                     



     extended releas                                                   



     release e                                                      

 

     aripiprazol ARIPip     [id number]           active           Aripi

prazole [facility]



     e 5 MG Oral razole       {tab(                                    Healt

h



     Tablet 5 mg 03:43:      s)}                                     System



     ARIPiprazol oral 03 PM                                              



     e 5 mg oral tablet EDT                                               



     tablet                                                        

 

     Sertraline sertra     [id number]           active           Sertra

line [facility]



     50 MG Oral line       {tab(                                    Health



     Tablet 50 mg 03:42:      s)}                                     System



     sertraline oral 53 PM                                              



     50 mg oral tablet EDT                                               



     tablet                                                        

 

     Lithium lithiu     [id number]           complet           Lithium 

[facility]



     Carbonate m 300       {cap(                ed             Carbonate Hea

lth



     300 MG Oral mg   02:04:      s)}                                     System



     Capsule oral 14 PM                                              



     lithium 300 capsul EDT                                               



     mg oral e                                                      



     capsule                                                        









      aripiprazole ARIPiprazole 2018       1     [id number]             active

             ARIPiprazole 5 mg oral

tablet; 1 tab(s) orally once a day Ordered: 4-May-2018       Start: 4-May-2018 E

nd: 

2018                              [facility]



     5 MG Oral 5 mg oral 02:03:57 PM      {tab(s)}                              

 Quantity: 30 [facility]      

Health



     Tablet tablet EDT                                          Refills: 0      

     System



     ARIPiprazole                                                               

   



     5 mg oral                                                                  



     tablet                                                                  









     Sertraline 100 MG sertraline 100 mg           1                   completed

                [facility]



     Oral Tablet Tablet, Ordered By:                                            

       Medical Center



     sertraline 100 mg Marissa-Mima  Guadalupe,                                    

               



     Tablet, Ordered By: MDDirections: 1                                        

           



     Marissa-Mima  Guadalupe, tablet oral daily on                                 

                  



     MDDirections: 1 waking                                                   



     tablet oral daily on                                                       

 



     waking                                                        

 

     Melatonin 3 MG Oral melatonin 3 mg           2                   completed 

               [facility]



     Tablet melatonin 3 mg Tablet, Ordered By:                                  

                 [facility]



     Tablet, Ordered By: Marissa-Mima  Guadalupe,                                  

                 



     Marissa-Mima  Guadalupe, MDDirections: 2                                      

             



     MDDirections: 2 tablet oral daily at                                       

            



     tablet oral daily at bedtime                                               

    



     bedtime                                                        

 

     aripiprazole 15 MG aripiprazole 15 mg           1                   complet

ed                [facility]



     Oral Tablet Tablet, Ordered By:                                            

       [facility]



     aripiprazole 15 mg Marissa-Mima  Guadalupe,                                   

                



     Tablet, Ordered By: MDDirections: 1                                        

           



     Marissa-Mima  Guadalupe, tablet oral daily at                                 

                  



     MDDirections: 1 bedtime                                                   



     tablet oral daily at                                                       

 



     bedtime                                                        







Insurance Providers







          Payer name Policy type Policy ID Covered   Covered party's Policy    P

larissa



                    / Coverage           party ID  relationship to [employer]    Inf

ormation



                    type                          [employer]              

 

          [facility]             [id number]                             [id number]

[id number]



          HEALTH NON                                                   



          CAP                                                         

 

          Medicaid E Medicaid  [id number]            1                   [id number]

W

 

          [employer]  Medicaid  [id number]            1                   [id number]



          Medicaid                                                    

 

          Self Pay  Self Pay                      1                   

 

          Medicaid  Medicaid  [id number]            1                   [id number]

 

                    W         [id number]            01                  [id number]

 

                    O                                                 

 

                                                                      

 

                    W         [id number]                              [id number]

 

                              [id number]              [id number]                  [id number]

 

                                                                      







Problems, Conditions, and Diagnoses







           Code       Display Name Description Problem Type Effective  Data



                                                       Dates      Source(s)

 

           F25.9      Schizoaffective Schizoaffective 74311-6    2020 [facility]

[facility]



                      disorder   disorder              12:00:00 AM Health System



                                                       EDT        

 

           F25.9      Schizoaffective Schizoaffective Diagnosis  2020 S 

[facility]



                      disorder,  disorder              [facility]

 

                      SCHIZOAFFECTIVE SCHIZOAFFECTIVE Diagnosis  2020 S 

[facility]



                      DISORDER   DISORDER              07:54:00 PM [facility]

 

           Z91.5      Personal history of History of Diagnosis  2020 S -

 [facility]



                      self-harm  self-harm             07:54:00 PM [facility]

 

           F17.210    Nicotine   Cigarette nicotine Diagnosis  2020 S - 

[facility]



                      dependence, dependence without            07:54:00 PM [facility]

on



                      cigarettes, complication            EDT        Hospital



                      uncomplicated                                  

 

           D72.829    Elevated white Leukocytosis Diagnosis  2020 S - [facility]

unt



                      blood cell count,                       07:54:00 [facility]

 

           Y90.0      Blood alcohol level BLOOD ALCOHOL LEVEL Diagnosis  

020 [facility]



                      of less than 20 OF LESS THAN 20            01:27:00 PM Med

ical



                      mg/100 ml  MG/100 ML             EDT        Center

 

           F10.229    Alcohol dependence ALCOHOL DEPENDENCE Diagnosis  

0 [facility]



                      with intoxication, WITH INTOXICATION,            01:27:00 

PM Medical



                      unspecified UNSPECIFIED            EDT        Center

 

           F14.20     Cocaine dependence, COCAINE DEPENDENCE, Diagnosis  

[facility] 

[place]



                      uncomplicated UNCOMPLICATED            01:27:00 PM Medical



                                                       EDT        Center

 

           Z59.0      Homelessness HOMELESSNESS Diagnosis  2020 [facility]

[facility]



                                                       01:27:00 PM Medical



                                                       EDT        Center

 

           Z56.0      Unemployment, UNEMPLOYMENT, Diagnosis  2020 [facility]



                      unspecified UNSPECIFIED            01:27:00 PM Medical



                                                       EDT        Center

 

           F32.9      Major depressive MAJOR DEPRESSIVE Diagnosis  2020 [facility]

[place]



                      disorder, single DISORDER, SINGLE            12:39:00 AM M

edical



                      episode,   EPISODE,              EST        Center



                      unspecified UNSPECIFIED                       







Surgeries/Procedures







             Procedure    Description  Date         Indications  Data Source(s)

 

             XR Chest Single AP view XR              2018                

[facility]



             Chest Single AP view              12:11:00 AM               System



                                       EDT -                     



                                       2018                



                                       12:11:00 AM               



                                       EDT                       

 

             Computed tomography of              2018                [facility]



             abdomen (procedure)              12:11:00 AM               System



                                       EDT -                     



                                       2018                



                                       12:11:00 AM               



                                       EDT                       

 

             Electrocardiographic              2018                [facility]



             procedure (procedure)              10:50:03 PM               System



                                       EDT -                     



                                       2018                



                                       10:54:00 PM               



                                       EDT                       







Results







                    ID                  Date                Data Source

 

                    [id number]  2020 11:57:00 AM EDT [facility]











          Name      Value     Range     Interpretation Code Description Data Fide

rce(s) Supporting



                                                                      Document(s

)

 

          SARS-CoV-2                                         [facility]    



          RNA Resp                                                    



          Ql                                                          



          RICHARD+probe                                                   









                                        This lab was ordered by [facility] a

nd reported by [facility].











                    ID                  Date                Data Source

 

                    [id number]         2020 01:40:00 PM EDT [facility]











          Name      Value     Range     Interpretation Description Data      Sup

porting



                                        Code                Source(s) Document(s

)

 

          SARS                                              [facility]   



          coronavirus 2                                                   



          RNA                                                         









                                        This lab was ordered by [facility]

[facility] and reported by [facility].











                    ID                  Date                Data Source

 

                    [id number]        2020 07:57:00 AM EDT [facility]











          Name      Value     Range     Interpretation Code Description Data Fide

rce(s) Supporting



                                                                      Document(s

)

 

          SARS-CoV-2                                         [facility] 



          RNA Resp                                          Health System 



          Ql                                                          



          RICHARD+probe                                                   









                                        This lab was ordered by [facility] a

nd reported by [facility].











                    ID                  Date                Data Source

 

                    [id number]  2020 11:11:00 PM EDT [facility]











          Name      Value     Range     Interpretation Code Description Data Fide

rce(s) Supporting



                                                                      Document(s

)

 

          SARS-CoV-2                                         [facility]    



          RNA Resp                                                    



          Ql                                                          



          RICHARD+probe                                                   









                                        This lab was ordered by [facility] a

nd reported by [facility].











                    ID                  Date                Data Source

 

                    [id number]      2020 06:36:00 PM EDT [facility]











          Name      Value     Range     Interpretation Description Data      Sup

porting



                                        Code                Source(s) Document(s

)

 

          Leukocytes 19.4                Above high WBC Count [facility] 



          [#/volume] in {10^3_uL            normal              Health    



          Unspecified }                                       System    



          specimen by                                                   



          Automated count                                                   

 

          Erythrocytes 5.50                Normal (applies RBC Count [facility] 



          [#/volume] in {10^6_uL            to non-numeric           Health    



          Blood by  }                   results)            System    



          Automated count                                                   

 

          Hemoglobin 15.6                Normal (applies Hemoglobin [facility] 



          [Mass/volume] in {gm/dL}             to non-numeric           Health  

  



          Blood                         results)            System    

 

          Erythrocyte mean 84.2 fl             Normal (applies MCV       [facility]

ore 



          corpuscular                     to non-numeric           Health    



          volume [Entitic                     results)            System    



          volume] by                                                   



          Automated count                                                   

 

          Hematocrit 46.3 %              Normal (applies Hematocrit [facility] 



          [Volume                       to non-numeric           Health    



          Fraction] of                     results)            System    



          Blood                                                       

 

          Erythrocyte mean 28.4 pg             Normal (applies MCH       [facility]

ore 



          corpuscular                     to non-numeric           Health    



          hemoglobin                     results)            System    



          [Entitic mass]                                                   



          by Automated                                                   



          count                                                       

 

          Erythrocyte 12.7 %              Normal (applies RDW-CV    [facility] 



          distribution                     to non-numeric           Health    



          width [Entitic                     results)            System    



          volume] by                                                   



          Automated count                                                   

 

          Platelets 334                 Normal (applies Platelet Count [facility]

e 



          [#/volume] in {10^3_uL            to non-numeric           Health    



          Plasma by }                   results)            System    



          Automated count                                                   

 

          Platelet mean 10.6 fl             Above high MPV       [facility] 



          volume [Entitic                     normal              Health    



          volume] in Blood                                         System    



          by Automated                                                   



          count                                                       

 

          Erythrocyte mean 33.7                Normal (applies [facility]      [facility]

[facility] 



          corpuscular {gm/dL}             to non-numeric           Health    



          hemoglobin                     results)            System    



          concentration                                                   



          [Mass/volume] by                                                   



          Automated count                                                   

 

          Eosinophils 0.10                Normal (applies Eosinophil # [facility]

e 



          [#/volume] in {10^3_uL            to non-numeric           Health    



          Blood     }                   results)            System    

 

          Neutrophils 16.3                Above high Neutrophil # [facility] 



          [#/volume] in {10^3_uL            normal              Health    



          Body fluid }                                       System    

 

          Lymphocyte 1.4                 Normal (applies Lymphocyte # [facility]

 



          percent   {10^3_uL            to non-numeric           Health    



          differential }                   results)            System    



          count                                                       



          (procedure)                                                   

 

          Monocytes 1.4                 Above high Monocyte # [facility] 



          [#/volume] in {10^3_uL            normal              Health    



          Blood by Manual }                                       System    



          count                                                       

 

          Basophils 0.09                Normal (applies Basophil # [facility] 



          [#/volume] in {10^3_uL            to non-numeric           Health    



          Blood by  }                   results)            System    



          Automated count                                                   

 

          Basophils/100 0.5 %               Normal (applies Basophil % [facility]

e 



          leukocytes in                     to non-numeric           Health    



          Unspecified                     results)            System    



          specimen by                                                   



          Manual count                                                   

 

          Lymphocytes 7.2 %               Below low normal Lymphocyte % [facility]

re 



          [#/volume] in                                         Health    



          Blood by                                          System    



          Automated count                                                   

 

          Monocytes/100 7.0 %               Normal (applies Monocyte % [facility]

e 



          leukocytes in                     to non-numeric           Health    



          Blood                         results)            System    

 

          Neutrophils/100 84.3 %              Above high Neutrophil % [facility]

 



          leukocytes in                     normal              Health    



          Blood by                                          System    



          Automated count                                                   

 

          Eosinophils/100 0.5 %               Normal (applies Eosinophil % [facility]

[facility] 



          leukocytes in                     to non-numeric           Health    



          Unspecified                     results)            System    



          specimen                                                    

 

          Nucleated 0.0                 Normal (applies NRBC %    [facility] 



          erythrocytes {/100_WB            to non-numeric           Health    



          [#/volume] in C}                  results)            System    



          Body fluid                                                   

 

          ImmatureGranuloc 0.5 %               Normal (applies Immature  [facility]

[facility] 



          ytes%                         to non-numeric Granulocytes % Health    



                                        results)            System    

 

          NRBC#     0.00                Below low normal NRBC #    [facility] 



                    {10^3_uL                                Health    



                    }                                       System    

 

          ImmatureGranuloc 0.09                Normal (applies Immature  [facility]

[facility] 



          ytes#     {10^3_uL            to non-numeric Granulocytes # Health    



                    }                   results)            System    











                    ID                  Date                Data Source

 

                    [id number]      2020 06:36:00 PM EDT [facility]

[facility]











          Name      Value     Range     Interpretation Description Data      Sup

porting



                                        Code                Source(s) Document(s

)

 

          Sodium    138                 Normal (applies Sodium, Serum [facility]

 



          [Moles/volume] in mmol/L              to non-numeric           Health 

   



          Serum or Plasma                     results)            System    

 

          Potassium 4.2                 Normal (applies Potassium, [facility] 



          [Mass/volume] in mmol/L              to non-numeric Serum     Health  

  



          Serum or Plasma                     results)            System    

 

          Glucose   103                 Normal (applies Glucose,  [facility] 



          [Mass/volume] in mg/dL               to non-numeric Serum     Health  

  



          Serum or Plasma                     results)            System    

 

          Carbon dioxide, 26.0                Normal (applies CO2, Serum [facility]

[facility] 



          total     mmol/L              to non-numeric           Health    



          [Moles/volume] in                     results)            System    



          Serum or Plasma                                                   

 

          Chloride  99                  Normal (applies Chloride, [facility] 



          [Moles/volume] in mmol/L              to non-numeric Serum     Health 

   



          Serum or Plasma                     results)            System    

 

          TotalProtein 7.8                 Normal (applies Total Protein [facility]

[facility] 



                    mg/dl               to non-numeric           Health    



                                        results)            System    

 

          Bilirubin.total 0.8                 Normal (applies Bilirubin, [facility]

[facility] 



          [Mass/volume] in mg/dl               to non-numeric Serum Total [facility]

    



          Serum or Plasma                     results)            System    

 

          Urea nitrogen 15                  Normal (applies Blood Urea [facility]

e 



          [Mass/volume] in mg/dl               to non-numeric Nitrogen, Health  

  



          Serum or Plasma                     results)  Serum     System    

 

          Creatinine 1.00                Normal (applies Creatinine, [facility] 



          [Mass/volume] in mg/dl               to non-numeric Serum     Health  

  



          Serum or Plasma                     results)            System    

 

          Alkaline  105                 Normal (applies Alkaline  [facility] 



          phosphatase {IU/L}              to non-numeric Phosphatase, Health    



          isoenzymes                     results)  Serum     System    



          [Enzymatic                                                   



          activity/volume]                                                   



          in Serum or Plasma                                                   



          by Heat stability                                                   

 

          Aspartate 26                  Normal (applies Aspartate [facility] 



          aminotransferase {IU/L}              to non-numeric Transaminase, Heal

th    



          [Enzymatic                     results)  Serum     System    



          activity/volume]                                                   



          in Serum or Plasma                                                   



          by With P-5'-P                                                   

 

          DirectBilirubin 0.4                 Normal (applies Direct    [facility]

re 



                    mg/dl               to non-numeric Bilirubin [facility]    



                                        results)            System    

 

          I.Phosphorus 4.2                 Normal (applies I. Phosphorus [facility]

[facility] 



                    mg/dl               to non-numeric           Health    



                                        results)            System    

 

          Albumin   4.7                 Normal (applies Albumin,  [facility] 



          [Mass/volume] in {gm/dl}             to non-numeric Serum     Health  

  



          Serum or Plasma                     results)            System    

 

          Urate     6.1                 Normal (applies Uric Acid, [facility] 



          [Mass/volume] in mg/dl               to non-numeric Serum     Health  

  



          Serum or Plasma                     results)            System    

 

          Calcium   9.4                 Normal (applies Calcium,  [facility] 



          [Mass/volume] in mg/dl               to non-numeric Total Serum Health

    



          Serum or Plasma                     results)            System    

 

          Alanine   26                  Normal (applies Alanine   [facility] 



          aminotransferase {IU/L}              to non-numeric Aminotransfer [facility]

th    



          [Enzymatic                     results)  ase, Serum System    



          activity/volume]                                                   



          in Serum or Plasma                                                   

 

          A/GRatio  1.52                Normal (applies A/G Ratio [facility] 



                                        to non-numeric           Health    



                                        results)            System    

 

          Anion gap in Serum 13.00               Above high Anion Gap [facility]

 



          or Plasma mmol/L              normal              Health    



                                                            System    

 

          Glomerular 81.00               Normal (applies GFR       [facility] 



          filtration                     to non-numeric           Health    



          rate/1.73 sq                     results)            System    



          M.predicted                                                   



          [Volume Rate/Area]                                                   



          in Serum or Plasma                                                   



          by                                                          



          Creatinine-based                                                   



          formula (CKD-EPI)                                                   









                                        eGFR will provide clinicians with a more

 accurate indicator of renal function 

then



                                        the serum creatinine. The eGFR is automa

tically calculated from an empiric 

formula



                                        (endorsed by the [employer]

ion) which incorporates age, sex, and



                                        race.Clinicians may notice surprisingly 

low GFR's with serum creatinine 

valueswithin



                                        normal range- particularly in elderly wo

men (with low muscle mass).In the 

hospital



                                        setting, the eGFR should add an element 

of safety in drug dosing, in assessing 

the



                                        risk of IV contrast administration, and 

in assessing vascular risk.The [facility] 

staging



                                        system is as follows:Normal:    eGFR >90

 with no kidney markersStage 1:   eGFR 

>90



                                        with kidney markers*Stage 2:   eGFR  60-

89Stage 3:   eGFR   30-59Stage 4:   eGFR



                                        15-29Stage 5:   eGFR <15 (usually requir

ing dialysis)*Markers include: 

Proteinuria,



                                        Hematuria, abnormal imaging-studies, or 

other blood or urine test abnormalities











                    ID                  Date                Data Source

 

                    [id number]      2020 06:36:00 PM EDT [facility]

[facility]











          Name      Value     Range     Interpretation Description Data      Sup

porting



                                        Code                Source(s) Document(s

)

 

          AlcoholE  NON DETECTED           Normal (applies to Alcohol Ethyl, [facility] 



          thyl,Blo  None                non-numeric Blood     Health System 



          od        Detected            results)                      











                    ID                  Date                Data Source

 

                    [id number]      2020 06:36:00 PM EDT [facility]

[facility]











          Name      Value     Range     Interpretation Description Data Source(s

) Supporting



                                        Code                          Document(s

)

 

          Lipase    45 U/L              Normal (applies to Lipase, Serum [facility]

[facility] 



          [Enzymatic                     non-numeric           Health System 



          activity/vo                     results)                      



          lume] in                                                    



          Serum or                                                    



          Plasma                                                      











                    ID                  Date                Data Source

 

                    [id number]      2020 06:36:00 PM EDT [facility]

[facility]











          Name      Value     Range     Interpretation Description Data Source(s

) Supporting



                                        Code                          Document(s

)

 

          PCO2*     46.5                Normal (applies to PCO2*     [facility] 



                    {mm_Hg}             non-numeric           Health System 



                                        results)                      

 

          BaseExce  4.50                Above high normal Base Excess* [facility]

e 



          ss*       {mEq/L}                                 [facility] 

 

          PH*       7.406               Normal (applies to PH*       [facility] 



                    {pH_units           non-numeric           Health System 



                    }                   results)                      

 

          Chloride  100                 Normal (applies to Chloride, VB [facility]

re 



          ,VB       mmol/L              non-numeric           Health System 



                                        results)                      

 

          Glucose,  102 mg/dL           Normal (applies to Glucose, VB [facility]

e 



          VB                            non-numeric           Health System 



                                        results)                      

 

          HCO3*     29.2                Above high normal HCO3*     [facility] 



                    mmol/L                                  [facility] 

 

          Lactate.  1.2                 Normal (applies to Lactate.  [facility] 



                    mmol/L              non-numeric           Health System 



                                        results)                      

 

          Potassiu  4.1                 Normal (applies to Potassium, VB [facility]

[facility] 



          m,VB      mmol/L              non-numeric           Health System 



                                        results)                      

 

          Sodium,V  138                 Normal (applies to Sodium, VB [facility]

 



          B         mmol/L              non-numeric           Health System 



                                        results)                      

 

          IonizedC  1.16                Normal (applies to Ionized   [facility] 



          alcium,V  mmol/L              non-numeric Calcium,  [facility] System 



          B                             results)                      

 

          H2Azhxym  39.70 %             Normal (applies to O2 Saturation* [facility] 



          tion*                         non-numeric           [facility] System 



                                        results)                      











                    ID                  Date                Data Source

 

                    [id number]      2020 06:36:00 PM EDT [facility]

[facility]











          Name      Value     Range     Interpretation Description Data Source(s

) Supporting



                                        Code                          Document(s

)

 

          Troponin  0.00                Normal (applies to Troponin I [facility]

 



          IQuantit  ng/mL               non-numeric Quantitative - [facility] System

 



          ative-MV                      results)  MV Only             



          Only                                                        











                    ID                  Date                Data Source

 

                    [id number]      2020 06:36:00 PM EDT [facility]

[facility]











          Name      Value     Range     Interpretation Description Data      Sup

porting



                                        Code                Source(s) Document(s

)

 

          Amphetamine Negative            Normal (applies Amphetamine [facility]

 



          [Mass/volume]                     to non-numeric Level, Urine [facility]  

  



          in Urine                      results)            System    









                                        Cut-off = 1000 ng/mL









          Barbiturates Negative            Normal (applies to Barbiturate [facility]

[facility] 



          [Mass/volume] in                     non-numeric Screen, Urine Health 

System 



          Urine by Screen                     results)                      



          method                                                      









                                        Cut-off = 200 ng/mL









          Cocaine   Negative            Normal (applies Cocaine   [facility] 



          metabolites.other                     to non-numeric Metabolite [facility]

 System 



          [Mass/volume] in Urine                     results)  Screen, Urine    

       









                                        Cut-off = 300 ng/mL









          Benzodiazepines Positive            Abnormal  Benzodiazepines, [facility]

[facility] 



          [Mass/volume] in                     (applies to Urine     Health Syst

em 



          Urine                         non-numeric                     



                                        results)                      









                                        Cut-off = 200 ng/mL









          [id number],Urine Negative            Normal (applies to Opiate 300, [facility]

[facility] 



                                        non-numeric results) Urine     System   

 









                                        Cut-off = 300 ng/mL









          Methadone Negative            Normal (applies to Methadone Level, [facility] 



          [Mass/volume] in                     non-numeric Urine     System    



          Urine                         results)                      









                                        Cut-off = 300 ng/mL









           Cannabinoid(THC) Negative              Normal (applies to Cannabinoid

 (THC) [facility] 

[facility]                                  



                                        non-numeric           System    



                                        results)                      









                                        Cutt-off = 50These results are for medic

al treatment only. The positive findings

 are



                                        unconfirmed. Request confirmatory/quanti

tative test if needed.









          Phencyclidine Negative            Normal (applies Phencyclidine, Urine

 [facility] 



          [Mass/volume] in                     to non-numeric           Health S

ystem 



          Urine                         results)                      









                                        Cut-off = 25 ng/mL











                    ID                  Date                Data Source

 

                    [id number]      2020 06:36:00 PM EDT [facility]

[facility]











          Name      Value     Range     Interpretation Description Data      Sup

porting



                                        Code                Source(s) Document(s

)

 

          Lithium   <  0.101            Below low normal Lithium Level, [facility]

re 



          [Mass/volum                               Serum     Health System 



          e] in Serum                                                   



          or Plasma                                                   









                                        Performed At [facility], [address] [place] [address]











                    ID                  Date                Data Source

 

                    [id number]      2020 06:36:00 PM EDT [facility]

[facility]











          Name      Value     Range     Interpretation Description Data      Sup

porting



                                        Code                Source(s) Document(s

)

 

          Leukocytes 8.6                 Normal (applies WBC Count [facility] 



          [#/volume] in {10^3_uL            to non-numeric           Health    



          Unspecified }                   results)            System    



          specimen by                                                   



          Automated count                                                   

 

          Hemoglobin 15.1                Normal (applies Hemoglobin [facility] 



          [Mass/volume] in {gm/dL}             to non-numeric           Health  

  



          Blood                         results)            System    

 

          Erythrocytes 5.27                Normal (applies RBC Count [facility] 



          [#/volume] in {10^6_uL            to non-numeric           Health    



          Blood by  }                   results)            System    



          Automated count                                                   

 

          Erythrocyte mean 85.4 fl             Normal (applies MCV       [facility]

[facility] 



          corpuscular                     to non-numeric           Health    



          volume [Entitic                     results)            System    



          volume] by                                                   



          Automated count                                                   

 

          Hematocrit 45.0 %              Normal (applies Hematocrit [facility] 



          [Volume                       to non-numeric           Health    



          Fraction] of                     results)            System    



          Blood                                                       

 

          Erythrocyte mean 28.7 pg             Normal (applies [facility]       [facility]

[facility] 



          corpuscular                     to non-numeric           Health    



          hemoglobin                     results)            System    



          [Entitic mass]                                                   



          by Automated                                                   



          count                                                       

 

          Erythrocyte 12.5 %              Normal (applies RDW-CV    [facility] 



          distribution                     to non-numeric           Health    



          width [Entitic                     results)            System    



          volume] by                                                   



          Automated count                                                   

 

          Erythrocyte mean 33.6                Normal (applies [facility]      [facility]

ore 



          corpuscular {gm/dL}             to non-numeric           Health    



          hemoglobin                     results)            System    



          concentration                                                   



          [Mass/volume] by                                                   



          Automated count                                                   

 

          Platelet mean 10.7 fl             Above high normal MPV       [facility]

re 



          volume [Entitic                                         Health    



          volume] in Blood                                         System    



          by Automated                                                   



          count                                                       

 

          Platelets 286                 Normal (applies Platelet  [facility] 



          [#/volume] in {10^3_uL            to non-numeric Count     Health    



          Plasma by }                   results)            System    



          Automated count                                                   











                    ID                  Date                Data Source

 

                    [id number]      2020 06:36:00 PM EDT [facility]

alth System











          Name      Value     Range     Interpretation Description Data      Sup

porting



                                        Code                Source(s) Document(s

)

 

          Human     NONREACTIVE           Normal ([facility] 



          immunodeficien Normal Range:           to non-numeric Routine   Health

    



          cy virus  Non                 results)  (antigen and System    



          antibody titer ReactiveNegativ                     antibody           

 



          measurement e for HIV-1                     testing)            



          (procedure) antigen and                                         



                    HIV-1/HIV-2                                         



                    antibodies.  No                                         



                    laboratory                                         



                    evidence of HIV                                         



                    infection.Test                                         



                    was performed                                         



                    at [address]                                           











                    ID                  Date                Data Source

 

                    [id number]      2020 06:36:00 PM EDT [facility]

alth System











          Name      Value     Range     Interpretation Description Data      Sup

porting



                                        Code                Source(s) Document(s

)

 

          Sodium    138                 Normal (applies Sodium, Serum [facility]

 



          [Moles/volume mmol/L              to non-numeric           Health Syst

em 



          ] in Serum or                     results)                      



          Plasma                                                      

 

          Potassium 4.5                 Normal (applies Potassium, [facility] 



          [Mass/volume] mmol/L              to non-numeric Serum     Health Syst

em 



          in Serum or                     results)                      



          Plasma                                                      

 

          Carbon    24.0                Normal (applies CO2, Serum [facility] 



          dioxide,  mmol/L              to non-numeric           Health System 



          total                         results)                      



          [Moles/volume                                                   



          ] in Serum or                                                   



          Plasma                                                      

 

          Glucose   86 mg/dL            Normal (applies Glucose, Serum [facility]

e 



          [Mass/volume]                     to non-numeric           Health Syst

em 



          in Serum or                     results)                      



          Plasma                                                      

 

          Chloride  104                 Normal (applies Chloride, [facility] 



          [Moles/volume mmol/L              to non-numeric Serum     Health Syst

em 



          ] in Serum or                     results)                      



          Plasma                                                      

 

          Creatinine 0.90                Normal (applies Creatinine, [facility] 



          [Mass/volume] mg/dl               to non-numeric Serum     Health Syst

em 



          in Serum or                     results)                      



          Plasma                                                      

 

          Calcium   9.3                 Normal (applies Calcium, Total [facility]

e 



          [Mass/volume] mg/dl               to non-numeric Serum     Health Syst

em 



          in Serum or                     results)                      



          Plasma                                                      

 

          Urea nitrogen 13 mg/dl            Normal (applies Blood Urea [facility]

e 



          [Mass/volume]                     to non-numeric Nitrogen, Health Syst

em 



          in Serum or                     results)  Serum               



          Plasma                                                      

 

          Anion gap in 10.00               Normal (applies Anion Gap [facility] 



          Serum or  mmol/L              to non-numeric           Health System 



          Plasma                        results)                      











                    ID                  Date                Data Source

 

                    [id number]      2020 06:36:00 PM EDT [facility]

alth System











          Name      Value     Range     Interpretation Description Data      Sup

porting



                                        Code                Source(s) Document(s

)

 

          Triglyceride 146                 Normal (applies Triglycerides, [facility]

[facility] 



          [Mass/volume] mg/dl               to non-numeric Serum     Health    



          in Serum or                     results)            System    



          Plasma                                                      









                                        Optimal = < 100 mg/dLBoderline High = 15

0 - 199 mg/dLHigh = 200 - 499 mg/dLVery 

High



                                        = > 500 mg/dL









          Cholesterol 165 mg/dl           Normal (applies Cholesterol, Serum [facility]

[facility] 



          [Mass/volume] in                     to non-numeric           Health S

ystem 



          Serum or Plasma                     results)                      









                                        <200 mg/dL = [id number] - 239 md/dL = 

Borderline>240 mg/dL = High Risk









          Cholesterol in HDL 38.0 mg/dL           Normal (applies HDL Cholestero

l, [facility] 



          [Mass/volume] in                     to non-numeric Serum     Health S

ystem 



          Serum or Plasma                     results)                      

 

          Cholesterol in LDL 97.8 mg/dL           Normal (applies Low Density Mo

[facility] 



          [Mass/volume] in                     to non-numeric Lipoprotein, Healt

h System 



          Serum or Plasma                     results)  Calculated           









                                        OPTIMAL: LESS THAN 100 mg/dLNEAR OPTIMAL

: 100 - 129 mg/dLBODERLINE HIGH: 130 - 

150



                                        mg/dL









          Cholesterol in VLDL 29.2                Normal (applies to VLDL, Serum

 [facility] 



          [Mass/volume] in Serum                     non-numeric results)       

    System    



          or Plasma                                                   

 

          CHDRisk   4.34                Normal (app[facility]D Risk  [facility] 

[facility] 



                                        non-numeric results)           System   

 











                    ID                  Date                Data Source

 

                    [id number]        03/15/2020 01:09:00 PM EDT [facility]

alth System











          Name      Value     Range     Interpretation Code Description Data Fide

rce(s) Supporting



                                                                      Document(s

)

 

          SARS-CoV-2                                         [facility] 



          RNA XXX Ql                                         Health System 



          RICHARD+probe                                                   









                                        This lab was ordered by [facility] a

nd reported by [facility].











                    ID                  Date                Data Source

 

                    [id number] 2020 06:36:00 AM ROZ Oliveira

[facility]



                    0500                                    











          Name      Value     Range     Interpretation Description Data      Sup

porting



                                        Code                Source(s) Document(s

)

 

          Hemoglobin           13.5-17.            <content  [facility]     



          [Mass/volume] in           5                   styleCode="[place]

   



          Blood                                   ">Hemoglobin Medical   



                                                  </content>14.3 Center    



                                                  G/DL<content           



                                                  styleCode="Ital           



                                                  ics">               



                                                  (13.5-17.5           



                                                  G/DL)</content>           

 

          Erythrocytes           4.4-5.9             <content  [facility]     



          [#/volume] in                               styleCode="[place]   



          Blood by                                ">[facility]   



          Automated count                               Cell Count Center    



                                                  </content>4.87           



                                                  MCUMM<content           



                                                  styleCode="Ital           



                                                  ics"> (4.4-5.9           



                                                  MCUMM)</content           



                                                  >                   

 

          Leukocytes           4.4-11.0            <content  [facility]     



          [#/volume] in                               styleCode="[facility]   



          Blood by                                ">[facility] Blood [facility]   



          Automated count                               Cell Count Center    



                                                  </content>7.52           



                                                  KCUMM<content           



                                                  styleCode="Ital           



                                                  ics"> (4.4-11.0           



                                                  KCUMM)</content           



                                                  >                   

 

          Hematocrit           41.0-53.            <content  [facility]     



          [Volume             0                   styleCode="[facility]   



          Fraction] of                               ">Hematocrit Medical   



          Blood by                                </content>43.1 Center    



          Automated count                               %<content           



                                                  styleCode="Ital           



                                                  ics">               



                                                  (41.0-53.0           



                                                  %)</content>           

 

          Platelets           130-400             <content  [facility]     



          [#/volume] in                               styleCode="[place]   



          Blood by                                ">Platelet Medical   



          Automated count                               Count     Center    



                                                  </content>249           



                                                  KCUMM<content           



                                                  styleCode="Ital           



                                                  ics"> (130-400           



                                                  KCUMM)</content           



                                                  >                   

 

          Erythrocyte mean           26.0-34.            <content  [facility]     



          corpuscular           0                   styleCode="[facility]   



          hemoglobin                               ">Mean    Medical   



          [Entitic mass]                               Corposcular Center    



          by Automated                               Hemoglobin           



          count                                   </content>29.4           



                                                  PG<content           



                                                  styleCode="Ital           



                                                  ics">               



                                                  (26.0-34.0           



                                                  PG)</content>           

 

          Erythrocyte mean           32.0-37.            <content  [facility]     



          corpuscular           0                   styleCode="[place] [place]   



          hemoglobin                               ">Mean Corpus. Medical   



          concentration                               Hgb       Center    



          [Mass/volume] by                               Concentration          

 



          Automated count                               ([facility])              



                                                  </content>33.2           



                                                  G/DL<content           



                                                  styleCode="Ital           



                                                  ics">               



                                                  (32.0-37.0           



                                                  G/DL)</content>           

 

          Erythrocyte mean           80.0-100            <content  [facility]     



          corpuscular           .0                  styleCode="[place]   



          volume [Entitic                               ">Mean    Medical   



          volume] by                               Corpuscular Center    



          Automated count                               Volume              



                                                  </content>88.5           



                                                  FL<content           



                                                  styleCode="Ital           



                                                  ics">               



                                                  (80.0-100.0           



                                                  FL)</content>           

 

          Erythrocyte           11.5-14.            <content  [facility]     



          distribution           5                   styleCode="[place]   



          width [Ratio] by                               ">Red Cell Medical   



          Automated count                               Distribution Center    



                                                  Width               



                                                  </content>12.3           



                                                  %<content           



                                                  styleCode="Ital           



                                                  ics">               



                                                  (11.5-14.5           



                                                  %)</content>           

 

          Platelet mean           8.0-11.0  Above high <content  [facility]     



          volume [Entitic                     normal    styleCode="[place] 

  



          volume] in Blood                               ">Mean Platelet Medical

   



          by Automated                               Volume    Center    



          count                                   </content>11.6           



                                                  FL H<content           



                                                  styleCode="Ital           



                                                  ics"> (8.0-11.0           



                                                  FL)</content>           

 

          UNK                 0.0                 <content  [facility]     



                                                  styleCode="[place]   



                                                  ">Nucleated Red Medical   



                                                  Blood Cell Center    



                                                  Count               



                                                  </content>0.00           



                                                  KCUMM<content           



                                                  styleCode="Ital           



                                                  ics"> (0.0           



                                                  KCUMM)</content           



                                                  >                   

 

          UNK                 0                   <content  [facility]     



                                                  styleCode="[place]   



                                                  ">Nucleated Red Medical   



                                                  Blood Cell Center    



                                                  </content>0.0           



                                                  /100<content           



                                                  styleCode="Ital           



                                                  ics"> (0            



                                                  /100)</content>           











                    ID                  Date                Data Source

 

                    [id number] 02/15/2020 12:35:00 AM EST [facility] 

Center











          Name      Value     Range     Interpretation Description Data      Sup

porting



                                        Code                Source(s) Document(s

)

 

          Triglyceride           < 150               <content  [facility]     



          [Mass/volume] in                               styleCode="[place] 

  



          Serum or Plasma                               d">Triglycerid Medical  

 



                                                  es        Center    



                                                  </content>122           



                                                  MG/DL<content           



                                                  styleCode="[place]           



                                                  lics"> (< 150           



                                                  MG/DL)</conten           



                                                  t>                  

 

          UNK                 < 100               <content  [facility]     



                                                  styleCode="[place]   



                                                  d">LDL-Cholest Medical   



                                                  satish      [place]    



                                                  </content>80           



                                                  MG/DL<content           



                                                  styleCode="[place]           



                                                  lics"> (< 100           



                                                  MG/DL)</conten           



                                                  t>                  

 

          Cholesterol           -<200               <content  [facility]     



          [Mass/volume] in                               styleCode="[facility] [place] 

  



          Serum or Plasma                               d">Cholesterol Medical  

 



                                                  </content>146 Center    



                                                  MG/DL<content           



                                                  styleCode="[place]           



                                                  lics"> (-<200           



                                                  MG/DL)</conten           



                                                  t>                  

 

          UNK                 > 60      Below low normal <content  [facility]     



                                                  styleCode="[place]   



                                                  d">HDL-   Medical   



                                                  Cholesterol [place]    



                                                  </content>42           



                                                  MG/DL               



                                                  L<content           



                                                  styleCode="[place]           



                                                  lics"> (> 60           



                                                  MG/DL)</conten           



                                                  t>                  











                    ID                  Date                Data Source

 

                    [id number] 02/15/2020 12:35:00 AM [facility]



                    -0500                                   











          Name      Value     Range     Interpretation Code Description Data Fide

rce(s) Supporting



                                                                      Document(s

)

 

          UNK                 4.2-5.8             <content  [facility] 



                                                  styleCode="[facility]

r 



                                                  >Hemoglobin A1C           



                                                  </content>5.7           



                                                  %<content           



                                                  styleCode="[facility]           



                                                  cs"> (4.2-5.8           



                                                  %)</content>           











                    ID                  Date                Data Source

 

                    [id number] 2020 01:30:00 AM [facility]



                    0                                       











          Name      Value     Range     Interpretation Description Data      Sup

porting



                                        Code                Source(s) Document(s

)

 

          Color of Urine           YELLOW              <content  [facility]     



                                                  styleCode="[facility] [place]   



                                                  d">Color, Medical   



                                                  Urine     Center    



                                                  </content>YELL           



                                                  OW <content           



                                                  styleCode="[place]           



                                                  lics"> (YELLOW           



                                                  )</content>           

 

          UNK                 CLEAR               <content  [facility]     



                                                  styleCode="[facility] [place]   



                                                  d">Urine  Medical   



                                                  Clarity   Center    



                                                  </content>LOIDA           



                                                  R <content           



                                                  styleCode="[facility]           



                                                  lics"> (CLEAR           



                                                  )</content>           

 

          Ketones             NEGATIVE            <content  [facility]     



          [Mass/volume]                               styleCode="[facility]   



          in Urine by                               d">Urine  Medical   



          Test strip                               Ketone    Center    



                                                  </content>NEGA           



                                                  TIVE                



                                                  MG/DL<content           



                                                  styleCode="[place]           



                                                  lics">              



                                                  (NEGATIVE           



                                                  MG/DL)</conten           



                                                  t>                  

 

          UNK                 NEGATIVE            <content  [facility]     



                                                  styleCode="[facility] [place]   



                                                  d">Urine  Medical   



                                                  Bilirubin Center    



                                                  </content>NEGA           



                                                  TIVE <content           



                                                  styleCode="[place]           



                                                  lics">              



                                                  (NEGATIVE           



                                                  )</content>           

 

          Specific            1.015-1.02 Below low normal <content  [facility]     



          gravity of           5                   styleCode="[facility]   



          Urine by Test                               d">Urine  Medical   



          strip                                   Specific  Center    



                                                  Gravity             



                                                  </content>1.01           



                                                  0  L<content           



                                                  styleCode="[place]           



                                                  lics">              



                                                  (1.015-1.025           



                                                  )</content>           

 

          Glucose             NEGATIVE            <content  [facility]     



          [Mass/volume]                               styleCode="[facility] [place]   



          in Urine by                               d">Urine  Medical   



          Test strip                               Glucose   Center    



                                                  </content>NEGA           



                                                  TIVE                



                                                  MG/DL<content           



                                                  styleCode="[place]           



                                                  lics">              



                                                  (NEGATIVE           



                                                  MG/DL)</conten           



                                                  t>                  

 

          Hemoglobin           NEGATIVE            <content  [facility]     



          [Presence] in                               styleCode="[facility]   



          Urine by Test                               d">Urine Blood Medical   



          strip                                   </content>NEGA Center    



                                                  TIVE <content           



                                                  styleCode="[place]           



                                                  lics">              



                                                  (NEGATIVE           



                                                  )</content>           

 

          Nitrite             NEGATIVE            <content  [facility]     



          [Presence] in                               styleCode="[facility]   



          Urine by Test                               d">Urine  Medical   



          strip                                   Nitrite   Center    



                                                  </content>NEGA           



                                                  TIVE <content           



                                                  styleCode="[place]           



                                                  lics">              



                                                  (NEGATIVE           



                                                  )</content>           

 

          Protein             NEGATIVE            <content  [facility]     



          [Mass/volume]                               styleCode="[facility] [place]   



          in Urine by                               d">Urine  Medical   



          Test strip                               Protein   Center    



                                                  </content>NEGA           



                                                  TIVE                



                                                  MG/DL<content           



                                                  styleCode="[place]           



                                                  lics">              



                                                  (NEGATIVE           



                                                  MG/DL)</conten           



                                                  t>                  

 

          pH of Urine by           4.5-8.0             <content  [facility]     



          Test strip                               styleCode="[facility] [place]   



                                                  d">Urine pH Medical   



                                                  </content>6.0 Center    



                                                  <content            



                                                  styleCode="[place]           



                                                  lics">              



                                                  (4.5-8.0            



                                                  )</content>           

 

          Urobilinogen           0.2-1.0             <content  [facility]     



          [Units/volume]                               styleCode="[facility] [place]   



          in Urine by                               d">Urine  Medical   



          Test strip                               Urobilinogen Center    



                                                  </content>0.2           



                                                  MG/DL<content           



                                                  styleCode="[place]           



                                                  lics">              



                                                  (0.2-1.0            



                                                  MG/DL)</conten           



                                                  t>                  

 

          Leukocyte           NEGATIVE            <content  [facility]     



          esterase                                styleCode="[facility] [place]   



          [Presence] in                               d">Urine  Medical   



          Urine by Test                               Leukocyte Center    



          strip                                   </content>NEGA           



                                                  TIVE <content           



                                                  styleCode="[place]           



                                                  lics">              



                                                  (NEGATIVE           



                                                  )</content>           











                    ID                  Date                Data Source

 

                    CHMROUTINECCDA.[id number] 2020 01:30:00 AM [facility]



                    -0500                                   











          Name      Value     Range     Interpretation Description Data      Sup

porting



                                        Code                Source(s) Document(s

)

 

          Cannabinoids                               <content  [facility]     



          [Presence] in                               styleCode="[facility]   



          Urine by Screen                               d">Cannabinoid Medical  

 



          method >50 ng/mL                               s         Center    



                                                  </content>NEGA           



                                                  TIVE NG/ML           



                                                  (Reference           



                                                  Range: not           



                                                  available)<br/           



                                                  >                   











                    ID                  Date                Data Source

 

                    [id number] 2020 01:25:00 AM [facility]



                    0500                                    











          Name      Value     Range     Interpretation Description Data      Sup

porting



                                        Code                Source(s) Document(s

)

 

          Hemoglobin           13.5-17.            <content  [facility]     



          [Mass/volume] in           5                   styleCode="[facility] [place]

   



          Blood                                   ">Hemoglobin Medical   



                                                  </content>15.4 Center    



                                                  G/DL<content           



                                                  styleCode="[facility]           



                                                  ics">               



                                                  (13.5-17.5           



                                                  G/DL)</content>           

 

          Erythrocytes           4.4-5.9             <content  [facility]     



          [#/volume] in                               styleCode="[facility] [place]   



          Blood by                                ">Red Blood Medical   



          Automated count                               Cell Count Center    



                                                  </content>5.22           



                                                  [facility]<content           



                                                  styleCode="[facility]           



                                                  ics"> (4.4-5.9           



                                                  [facility]UMM)</content           



                                                  >                   

 

          Leukocytes           4.4-11.0  Above high <content  [facility]     



          [#/volume] in                     normal    styleCode="[facility]   



          Blood by                                ">White Blood Medical   



          Automated count                               Cell Count Center    



                                                  </content>15.43           



                                                  [facility] H<content           



                                                  styleCode="[facility]           



                                                  ics"> (4.4-11.0           



                                                  [facility])</content           



                                                  >                   

 

          Platelets           130-400             <content  [facility]     



          [#/volume] in                               styleCode="[facility] [place]   



          Blood by                                ">Platelet Medical   



          Automated count                               Count     Center    



                                                  </content>273           



                                                  [facility]<content           



                                                  styleCode="[facility]           



                                                  ics"> (130-400           



                                                  [facility])</content           



                                                  >                   

 

          Hematocrit           41.0-53.            <content  [facility]     



          [Volume             0                   styleCode="[facility] [place]   



          Fraction] of                               ">Hematocrit Medical   



          Blood by                                </content>45.0 Center    



          Automated count                               %<content           



                                                  styleCode="[facility]           



                                                  ics">               



                                                  (41.0-53.0           



                                                  %)</content>           

 

          Erythrocyte           11.5-14.            <content  [facility]     



          distribution           5                   styleCode="[facility] [place]   



          width [Ratio] by                               ">Red Cell Medical   



          Automated count                               Distribution Center    



                                                  Width               



                                                  </content>12.6           



                                                  %<content           



                                                  styleCode="[facility]           



                                                  ics">               



                                                  (11.5-14.5           



                                                  %)</content>           

 

          Erythrocyte mean           32.0-37.            <content  [facility]     



          corpuscular           0                   styleCode="[facility] [place]   



          hemoglobin                               ">Mean Corpus. Medical   



          concentration                               Hgb       Center    



          [Mass/volume] by                               Concentration          

 



          Automated count                               (MCHC)              



                                                  </content>34.2           



                                                  G/DL<content           



                                                  styleCode="[facility]           



                                                  ics">               



                                                  (32.0-37.0           



                                                  G/DL)</content>           

 

          Erythrocyte mean           80.0-100            <content  [facility]     



          corpuscular           .0                  styleCode="[facility] [place]   



          volume [Entitic                               ">Mean    Medical   



          volume] by                               Corpuscular Center    



          Automated count                               Volume              



                                                  </content>86.2           



                                                  FL<content           



                                                  styleCode="[facility]           



                                                  ics">               



                                                  (80.0-100.0           



                                                  FL)</content>           

 

          Erythrocyte mean           26.0-34.            <content  [facility]     



          corpuscular           0                   styleCode="[place] [place]   



          hemoglobin                               ">Mean    Medical   



          [Entitic mass]                               Corposcular Center    



          by Automated                               Hemoglobin           



          count                                   </content>29.5           



                                                  PG<content           



                                                  styleCode="[facility]           



                                                  ics">               



                                                  (26.0-34.0           



                                                  PG)</content>           

 

          UNK                 0.0                 <content  [facility]     



                                                  styleCode="[place] [place]   



                                                  ">Nucleated Red Medical   



                                                  Blood Cell Center    



                                                  Count               



                                                  </content>0.00           



                                                  [facility]<content           



                                                  styleCode="[facility]           



                                                  ics"> (0.0           



                                                  [facility])</content           



                                                  >                   

 

          UNK                 0                   <content  [facility]     



                                                  styleCode="[place] [place]   



                                                  ">Nucleated Red Medical   



                                                  Blood Cell Center    



                                                  </content>0.0           



                                                  /100<content           



                                                  styleCode="[facility]           



                                                  ics"> (0            



                                                  /100)</content>           

 

          Platelet mean           8.0-11.0  Above high <content  [facility]     



          volume [Entitic                     normal    styleCode="[place] [place] 

  



          volume] in Blood                               ">Mean Platelet Medical

   



          by Automated                               Volume    Center    



          count                                   </content>11.4           



                                                  FL H<content           



                                                  styleCode="Ital           



                                                  ics"> (8.0-11.0           



                                                  FL)</content>           











                    ID                  Date                Data Source

 

                    GFR(Creatinine).[id number] 2020 01:25:00 AM [facility]



                    0-0500                                  











          Name      Value     Range     Interpretation Code Description Data Fide

rce(s) Supporting



                                                                      Document(s

)

 

          UNK                 > 60                <content  [facility] 



                                                  styleCode="[facility]"> Medical Cent

er 



                                                  EGFR                



                                                  </content>130           



                                                  GFR<content           



                                                  styleCode="[facility]           



                                                  s"> (> 60           



                                                  GFR)</content>           











                    ID                  Date                Data Source

 

                    CardiacMarkers.[id number] 2020 01:25:00 AM [facility]



                    -0500                                   











          Name      Value     Range     Interpretation Description Data      Sup

porting



                                        Code                Source(s) Document(s

)

 

          Troponin            < 0.034             <content  [facility]     



          I.cardiac                               styleCode="[place] [place]   



          [Mass/volume                               ">Troponin I Medical   



          ] in Serum                               </content>< Center    



          or Plasma                               0.012               



                                                  NG/ML<content           



                                                  styleCode="[facility]           



                                                  ics"> (< 0.034           



                                                  NG/ML)</content           



                                                  >                   











                    ID                  Date                Data Source

 

                    [id number] 2020 01:25:00 AM EST [facility]











          Name      Value     Range     Interpretation Description Data      Sup

porting



                                        Code                Source(s) Document(s

)

 

          UNK                 9-20                <content  [facility]     



                                                  styleCode="[place] [place]   



                                                  d">BUN    Medical   



                                                  </content>16 Center    



                                                  MG/DL<content           



                                                  styleCode="[place]           



                                                  lics"> (9-20           



                                                  MG/DL)</conten           



                                                  t>                  

 

          Sodium              137-145             <content  [facility]     



          [Moles/volume]                               styleCode="[facility] [place]   



          in Serum or                               d">Sodium Medical   



          Plasma                                  </content>139 Center    



                                                  MEQ/L<content           



                                                  styleCode="[place]           



                                                  lics">              



                                                  (137-145            



                                                  MEQ/L)</conten           



                                                  t>                  

 

          Potassium           3.5-5.3             <content  [facility]     



          [Moles/volume]                               styleCode="[facility] [place]   



          in Serum or                               d">Potassium Medical   



          Plasma                                  </content>3.9 Center    



                                                  MEQ/L<content           



                                                  styleCode="[place]           



                                                  lics">              



                                                  (3.5-5.3            



                                                  MEQ/L)</conten           



                                                  t>                  

 

          Carbon              22-30               <content  [facility]     



          dioxide, total                               styleCode="[facility]   



          [Moles/volume]                               d">Carbon Medical   



          in Serum or                               Dioxide   Center    



          Plasma                                  </content>25           



                                                  MEQ/L<content           



                                                  styleCode="[place]           



                                                  lics"> (22-30           



                                                  MEQ/L)</conten           



                                                  t>                  

 

          Chloride                          <content  [facility]     



          [Moles/volume]                               styleCode="[facility] [place]   



          in Serum or                               d">Chloride Medical   



          Plasma                                  </content>103 Center    



                                                  MEQ/L<content           



                                                  styleCode="[place]           



                                                  lics"> (           



                                                  MEQ/L)</conten           



                                                  t>                  

 

          Calcium             8.4-10.2            <content  [facility]     



          [Mass/volume]                               styleCode="[facility] Andrewss   



          in Serum or                               d">Calcium Medical   



          Plasma                                  </content>9.4 Center    



                                                  MG/DL<content           



                                                  styleCode="[place]           



                                                  lics">              



                                                  (8.4-10.2           



                                                  MG/DL)</conten           



                                                  t>                  

 

          UNK                 > 60                <content  [facility]     



                                                  styleCode="[facility] [place]   



                                                  d">EGFR   Medical   



                                                  </content>130 Center    



                                                  GFR<content           



                                                  styleCode="[place]           



                                                  lics"> (> 60           



                                                  GFR)</content>           

 

          Creatinine           0.5-1.3             <content  [facility]     



          [Mass/volume]                               styleCode="[facility]ad Andrewss   



          in Serum or                               d">Creatinine Medical   



          Plasma                                  </content>0.7 Center    



                                                  MG/DL<content           



                                                  styleCode="[place]           



                                                  lics">              



                                                  (0.5-1.3            



                                                  MG/DL)</conten           



                                                  t>                  

 

          Glucose                           <content  [facility]     



          [Mass/volume]                               styleCode="Chad Andrewss   



          in Serum or                               d">Glucose Medical   



          Plasma                                  </content>104 Center    



                                                  MG/DL<content           



                                                  styleCode="[place]           



                                                  lics"> (           



                                                  MG/DL)</conten           



                                                  t>                  











                    ID                  Date                Data Source

 

                    [id number]     [id number] 11:59:58 PM -04:56:02 [facility]

iagnostics











          Name      Value     Range     Interpretation Code Description Data Fide

rce(s) Supporting



                                                                      Document(s

)

 

          SARS-COV-2                                         [facility]     



          RNA RESP                                          Diagnostics 



          QL                                                          



          RICHARD+PROBE                                                   









                                        This lab was ordered by MIGUEL braswell nd reported by RAHEEL NDIAYE.









                                        Procedure

 

                                        







Social History







           Code       Duration   Value      Status     Description Data Source(s

)

 

           Smoking    02/15/2020 Denies Ever completed  Denies Ever Smoked [facility]

 [place]



                      06:04:00 PM EST Smoked                           Medical C

enter

 

           Smoking    02/15/2020 Daily Smoker completed  Daily Smoker [facility]

[facility]



                      03:40:00 AM EST                                  Medical C

enter

 

           Smoking    2020 Daily Smoker completed  Daily Smoker [facility]

[facility]



                      10:52:00 PM EST                                  Medical C

enter

 

           Smoking    2020 Daily Smoker completed  Daily Smoker [facility]

[facility]



                      09:22:00 PM EST                                  Medical C

enter

 

           Smoking    2020 Daily Smoker completed  Daily Smoker [facility]

[facility]



                      07:00:00 AM EST                                  Medical C

enter

 

           Smoking    2020 Daily Smoker completed  Daily Smoker [facility]

[facility]



                      01:08:00 AM EST                                  Medical C

enter

 

           Smoking    2020 Daily Smoker completed  Daily Smoker [facility]

[facility]



                      12:51:00 AM EST                                  Medical C

enter







Vital Signs







                    ID                  Date                Data Source

 

                    UNK                                     











           Name       Value      Range      Interpretation Code Description Data

 Source(s)

 

           Diastolic blood 68 mm[Hg]  0 - 999    Normal (applies to 68 mm[Hg]  

[facility]



           pressure                         non-numeric            [facility]



                                            results)              

 

           Systolic blood 107 mm[Hg] 0 - 999    Below low normal 107 mm[Hg] [facility]

efiore



           pressure                                               Health System

 

           Respiratory rate 16         0 - 999    Normal ([facility] 16         

[facility]



                                            [facility]            [facility]



                                            results)              

 

           Heart rate 64         0 - 999    Normal (applies to 64         [facility]

i[facility]



                                            non-numeric            Health System



                                            results)              

 

           Body temperature 36.1 [place]   0 - 99.9   Below low normal 36.1 [place]   [facility]



                                                                  [facility]

 

           Body temperature 97 [degF]  0 - 200    Normal (applies to 97 [degF]  

[facility]



                                            non-numeric            Health System



                                            results)              

 

           Body mass index 29.8 kg/m2                       29.8 kg/m2 [facility]

e



           (BMI) [Ratio]                                             Health Syst

em

 

           Body surface area 2 m2                             2 m2       [facility]

ore



           Derived from                                             Health Syste

m



           formula                                                

 

           Body weight 91.62 kg                         91.62 kg   [facility]



                                                                  Health System

 

           Body height 175.26 cm                        175.26 cm  [facility]

 

           Oxygen saturation 98 %       0 - 999    Normal (applies to 98 %      

 [facility]



           in Arterial blood                       [facility]            Health

 System



           by Pulse oximetry                       results)              

 

           Heart rate 81 /min                          81 /min    [facility]

 

           Diastolic blood 84 mm[Hg]                        84 mm[Hg]  [facility] pressure                                               Medical Center

 

           Systolic blood 118 mm[Hg]                       118 mm[Hg] [facility] pressure                                               [facility] [place]

 

           Body temperature 36.319477                        36.060755 [place] [facility]

 

           Respiratory rate 19 /min                          19 /min    [facility]

 

           Heart rate 64 /min                          64 /min    [facility]

 

           Diastolic blood 79 mm[Hg]                        79 mm[Hg]  [facility] pressure                                               Medical Center

 

           Systolic blood 123 mm[Hg]                       123 mm[Hg] [facility]

 

           Body temperature 36.638877                        36.489031 [place] [facility]

 

           Respiratory rate 20 /min                          20 /min    [facility]

 

           Heart rate 62 /min                          62 /min    [facility]

 

           Diastolic blood 68 mm[Hg]                        68 mm[Hg]  [facility] pressure                                               [facility] [place]

 

           Systolic blood 107 mm[Hg]                       107 mm[Hg] [facility] pressure                                               [facility] [place]

 

           Body weight 91.088237                        91.445420 kg [facility]

hs



           Measured   kg                                          Medical Center

 

           Heart rate 81 /min                          81 /min    [facility]

 

           Diastolic blood 76 mm[Hg]                        76 mm[Hg]  [facility] pressure                                               Medical Center

 

           Systolic blood 116 mm[Hg]                       116 mm[Hg] [facility]

 

           Body temperature 36.439973                        36.155312 [facility]

 

           Respiratory rate 19 /min                          19 /min    [facility]

 

           Heart rate 64 /min                          64 /min    [facility]

 

           Diastolic blood 75 mm[Hg]                        75 mm[Hg]  [facility] pressure                                               Medical Center

 

           Systolic blood 120 mm[Hg]                       120 mm[Hg] [facility]

 

           Body temperature 36.256047                        36.802833 [place] [facility]

 

           Respiratory rate 18 /min                          18 /min    [facility]

 

           Body temperature 36.601444                        36.211211 [place] [facility]

 

           Respiratory rate 20 /min                          20 /min    [facility]

 

           Body weight 90.044946                        90.996731 kg [facility]

hs



           Measured   kg                                          Medical Center

 

           Body weight 93.676770                        93.518867 kg [facility]

hs



           Measured   kg                                          Medical Center

 

           Body weight 91.553501                        91.171380 kg [facility]

hs



           Measured   kg                                          Medical Center

 

           Body height 175.475440                       175.830071 cm [facility] cm [facility]

 

           Body mass index 29.95 kg/m2                       29.95 kg/m2 [facility]

[facility]ph[facility]



           (BMI) [Ratio]                                             Medical [facility]

ter

 

           Oxygen saturation 95 %                             95 %       [facility]

osephs



           in Arterial blood                                             Medical

 [place]



           by Pulse oximetry                                             

 

           Body temperature 36.123483                        36.071181 [facility]

 

           Respiratory rate 16 /min                          16 /min    [facility]

 

           Heart rate 74 /min                          74 /min    [facility]

 

           Diastolic blood 62 mm[Hg]                        62 mm[Hg]  [facility]

 

           Systolic blood 110 mm[Hg]                       110 mm[Hg] [facility]

 

           Oxygen saturation 98 %                             98 %       [facility]

osephs



           in [facility] blood                                             [facility]



           by Pulse oximetry                                             

 

           Respiratory rate 16 /min                          16 /min    [facility]

 

           Heart rate 72 /min                          72 /min    [facility]

 

           Diastolic blood 82 mm[Hg]                        82 mm[Hg]  [facility]

 

           Systolic blood 129 mm[Hg]                       129 mm[Hg] [facility]

 

           Oxygen saturation 95 %                             95 %       [facility]

osephs



           in [facility] blood                                             [facility]



           by Pulse oximetry                                             

 

           Body temperature 36.983005                        36.751985 Cel [facility]

 

           Respiratory rate 18 /min                          18 /min    [facility]

 

           Heart rate 70 /min                          70 /min    [facility]

 

           Diastolic blood 89 mm[Hg]                        89 mm[Hg]  [facility]

 

           Systolic blood 146 mm[Hg]                       146 mm[Hg] [facility]

 

           Oxygen saturation 98 %                             98 %       [facility]

osephs



           in [facility] blood                                             [facility]



           by Pulse oximetry                                             

 

           Body temperature 36.782134                        36.907464 Cel [facility]

 

           Respiratory rate 18 /min                          18 /min    [facility]

 

           Heart rate 69 /min                          69 /min    [facility]

 

           Diastolic blood 55 mm[Hg]                        55 mm[Hg]  [facility]

 

           Systolic blood 131 mm[Hg]                       131 mm[Hg] [facility]

 

           Oxygen saturation 96 %                             96 %       [facility]

osephs



           in [facility] blood                                             [facility]



           by Pulse oximetry                                             

 

           Body temperature 37.511794                        37.511050 Cel [facility]

 

           Respiratory rate 17 /min                          17 /min    [facility]

 

           Heart rate 78 /min                          78 /min    [facility]

 

           Diastolic blood 68 mm[Hg]                        68 mm[Hg]  [facility]



           pressure                                               Medical Center

 

           Systolic blood 118 mm[Hg]                       118 mm[Hg] [facility]



           pressure                                               Medical Center

 

           Body temperature 36.475679                        36.754323 [place] [facility]

 

           Body weight 85.456793                        85.068668 kg [facility]

hs



           Measured   kg                                          [facility]

 

           Body height 170.147159                       170.798961 cm [facility]



                      cm                                          Medical Center

 

           Body mass index 29.3 kg/m2                       29.3 kg/m2 [facility]



           (BMI) [Ratio]                                             Medical [facility]

ter







Patient Treatment Plan of Care







             Planned Activity Planned Date Details      Description  Data Source

(s)

 

             24 HR Nicotine 0.583 2020 03:43:28                           

[facility]



             MG/HR Transdermal Patch PM EDT                                 Syst

em

 

             aripiprazole 5 MG Oral 2020 03:43:03                         

  [facility]



             Tablet       PM EDT                                 System

 

             Sertraline 50 MG Oral 2020 03:42:53                          

 [facility]



             Tablet       PM EDT                                 System

 

             Lithium Carbonate 300 MG 2018 02:04:14                       

    [facility]



             Oral Capsule PM EDT                                 System

 

             aripiprazole 5 MG Oral 2018 02:03:57                         

  [facility]



             Tablet       PM EDT                                 System

 

             aripiprazole 15 MG Oral                                        [facility]

 

             Melatonin 3 MG Oral                                        [facility]

 

             Sertraline 100 MG Oral                                        [facility]

## 2020-10-03 NOTE — PN
Mental Health Exam





- Mental Status Exam


Alert and Oriented to: Time, Place, Person


Cognitive Function: Impaired (unable to recall past hospitalizations, vague 

historian. )


Patient Appearance: Unkempt


Mood: Apathetic, Suspicious, Withdrawn, Apprehensive


Affect: Inappropriate


Patient Behavior: Impulsive, Talkative, Cooperative


Speech Pattern: Rambling, Perseverating, Tangential


Voice Loudness: Mildly Loud


Thought Process: Circumstantial, Tangential


Thought Disorder: Grandiose, Bizarre


Hallucinations: Auditory ("lorri" talk to me, tells me to push others and myself 

on the [employer] or light myself and my creditors up with gasoline. ), Visual (i 

see myself sitting on tracks a waiting a train to run me over. )


Suicidal Ideation: Current, Past, Plan


Homicidal Ideation: Current


Insight/Judgement: Poor


Sleep: Difficulty falling asleep


Appetite: Fair


Muscle strength/Tone: Normal


Gait/Station: Deferred


Additional Comments: This is a 46yo single male admitted to [facility] for 

leucytosis, suicidal ideation, and acchol detoxicification. Client has been 

drinking heaviy for past 2 months, 10 bud beer a day a half litre of Domi 

and champayne daily. 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 

construction jobs. Client is domiciled in home that he shares with same Uncle, 

not , no children.  Client was recently inpatient at [facility] 9/17-9/20.

 Client exhibits suicidal and homicidal ideation. He has command auditatory 

halucinations to put self and others onto the train tracks. Client has acess to 

gasoline, was to douse others, self with this and set it ablaze. "i dont want to

live anymore". Client ongoing conversation to "lorri", a delusional character.  

He is vague in conversation, affect is incongruent. Client has stopped taking 

his bipolar meds some years ago, will re start.  Dx.. Bipolar disorder in severe

depression with psychotic features.  Plan.. Recommend admission to Psychiatric 

inpatient for stabilization.  Restart Risperidal 1mg po bid.  Start Lithium 

300mg po bid.

## 2020-10-03 NOTE — PN
Teaching Attending Note


Name of Resident: Rudi Ospina





ATTENDING PHYSICIAN STATEMENT





I saw and evaluated the patient.


I reviewed the resident's note and discussed the case with the resident.


I agree with the resident's findings and plan as documented.








SUBJECTIVE:


seen in am . has pain in L hip and R knee. had fever x 2 days at home. reprots 

diarrhea x 4 days , saw blood at some point . diarrhea stopped. no abd pain. has

R s ided chest discomfort. he sees people who ask him tp throw himself in front 

of [employer] . feels severely sad and cont to have SI . 


stopped taking Lithium and ilanzapine as he got better. he admits to taking 

seroquel but does not remember the dose or the place he picks it up frm 








OBJECTIVE:


NAD, calm , flat affect, slow speech , MMM, + eye contact. EOMI, no nystgagmus 


CV : RRR, no MRG 


Lungs: CTAB 


Abd: soft, NT, ND, NL BS. 


Ext : No edema or erythema on upper or lower extremities. pain in L groin , and 

in R knee, not able to flex R knee upon request 


Abd : soft, NT, Nd , NL BS 


Skin : a well healed irregular scar in L chest wall around L  nipple 














ASSESSMENT AND PLAN:





46 y/o man with h/o bioplar and depression and several suicidal attempts , 

withdrawal seizures, ETOH abuse, and non compliance with psych meds who  

presented with SI . 





1- SI , severe depression with psychotic features, and Bipolar Do


- appreciate Psych eval and Recs 


- Start respirdal and Lithium 


- cont 1: 1 


- appropriate for Inpt psych admission 





2- SIRS criteria met: Leukocytosis , and fever . no source of infection . this 

could be due to ETOH withdrawal.


did not receive Abx in Er and WBC dropped to 11 k. 


monitor 


if diarrhea recur , can send stool studies 


follow blood cx 





3- Reported hematochezia .  not sure of accuracy. will obtain FOBT  , monitor 

for any signs of bleeding. Hb is normal 





4- s/p Fall : 


- obtain L hip CT and R knee xray 





5- ETOH withdrawal : 


- cont librium 


- cont thiamine and Folic 


- no signs of Wernicke's  





-DVT PX : add lovenox  





Cont 1:1. Can NOT leave AMA
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
## 2020-10-04 LAB
ANION GAP SERPL CALC-SCNC: 6 MMOL/L (ref 8–16)
BUN SERPL-MCNC: 16.8 MG/DL (ref 7–18)
CALCIUM SERPL-MCNC: 8.5 MG/DL (ref 8.5–10.1)
CHLORIDE SERPL-SCNC: 106 MMOL/L (ref 98–107)
CO2 SERPL-SCNC: 26 MMOL/L (ref 21–32)
CREAT SERPL-MCNC: 1 MG/DL (ref 0.55–1.3)
DEPRECATED RDW RBC AUTO: 13.7 % (ref 11.9–15.9)
GLUCOSE SERPL-MCNC: 97 MG/DL (ref 74–106)
HCT VFR BLD CALC: 41 % (ref 35.4–49)
HGB BLD-MCNC: 13.8 GM/DL (ref 11.7–16.9)
MAGNESIUM SERPL-MCNC: 2.2 MG/DL (ref 1.8–2.4)
MCH RBC QN AUTO: 29.7 PG (ref 25.7–33.7)
MCHC RBC AUTO-ENTMCNC: 33.7 G/DL (ref 32–35.9)
MCV RBC: 88 FL (ref 80–96)
PHOSPHATE SERPL-MCNC: 2.7 MG/DL (ref 2.5–4.9)
PLATELET # BLD AUTO: 218 K/MM3 (ref 134–434)
PMV BLD: 9.2 FL (ref 7.5–11.1)
POTASSIUM SERPLBLD-SCNC: 4 MMOL/L (ref 3.5–5.1)
RBC # BLD AUTO: 4.65 M/MM3 (ref 4–5.6)
SODIUM SERPL-SCNC: 138 MMOL/L (ref 136–145)
WBC # BLD AUTO: 7 K/MM3 (ref 4–10)

## 2020-10-04 RX ADMIN — ENOXAPARIN SODIUM SCH MG: 40 INJECTION SUBCUTANEOUS at 09:08

## 2020-10-04 RX ADMIN — FOLIC ACID SCH MG: 1 TABLET ORAL at 09:10

## 2020-10-04 RX ADMIN — LITHIUM CARBONATE SCH MG: 300 CAPSULE, GELATIN COATED ORAL at 09:09

## 2020-10-04 RX ADMIN — Medication SCH MG: at 09:09

## 2020-10-04 RX ADMIN — LITHIUM CARBONATE SCH MG: 300 CAPSULE, GELATIN COATED ORAL at 22:29

## 2020-10-04 NOTE — PN
Progress Note (short form)





- Note


Progress Note: 





Subjective:


seen at around 10:30 AM 


no fever or chills. pain in legs. he complains of shakes ( although non visible 

) . cont to have SI , and cont to be depressed 


Objective:








Vital Signs:


                                Last Vital Signs











Temp Pulse Resp BP Pulse Ox


 


 97.6 F   78   20   125/57 L  96 


 


 10/04/20 14:00  10/04/20 14:00  10/04/20 14:00  10/04/20 14:00  10/04/20 14:00








                         Laboratory Results - last 24 hr











  10/02/20 10/04/20 10/04/20





  23:00 07:00 07:00


 


WBC   7.0 


 


RBC   4.65 


 


Hgb   13.8 


 


Hct   41.0 


 


MCV   88.0 


 


MCH   29.7 


 


MCHC   33.7 


 


RDW   13.7 


 


Plt Count   218 


 


MPV   9.2 


 


Sodium    138


 


Potassium    4.0


 


Chloride    106


 


Carbon Dioxide    26


 


Anion Gap    6 L


 


BUN    16.8


 


Creatinine    1.0


 


Est GFR (CKD-EPI)AfAm    104.88


 


Est GFR (CKD-EPI)NonAf    90.49


 


Random Glucose    97


 


Calcium    8.5


 


Phosphorus    2.7


 


Magnesium    2.2


 


COVID-19 (RICHARD)  Not detected  











OBJECTIVE:


NAD, calm , flat affect, MMM


CV : RRR, no MRG 


Lungs: CTAB 


Abd: soft, ND, mild tenderness to palpationin suprapubic area ( did not urinate 

since last night )  NL BS. 


Ext : No edema or erythema on upper or lower extremities.














ASSESSMENT AND PLAN:





44 y/o man with h/o bioplar and depression and several suicidal attempts , 

withdrawal seizures, ETOH abuse, and non compliance with psych meds who  

presented with SI . 





1- SI , severe depression with psychotic features, and Bipolar Do


- CONT respirdal and Lithium 


- cont 1: 1 


- complete 2 PC form .





2- SIRS criteria met: Leukocytosis  resolved with no Abx. fever resolved . mon

itor off Abx .


- follow blood cx 





3- Reported hematochezia .  no BM here. Hb stable . f/u with GI at dc 


4- s/p Fall : 


- No fx on  pelvis CT and R knee xray 





5- ETOH withdrawal : 


- cont librium 


- cont thiamine and Folic 





-DVT PX: lovenox  





Cont 1:1. Can NOT leave AMA




















Visit type





- Emergency Visit


Emergency Visit: Yes


ED Registration Date: 10/03/20


Care time: The patient presented to the Emergency Department on the above date 

and was hospitalized for further evaluation of their emergent condition.





- New Patient


This patient is new to me today: No





- Critical Care


Critical Care patient: No

## 2020-10-04 NOTE — EKG
Test Reason : 

Blood Pressure : ***/*** mmHG

Vent. Rate : 104 BPM     Atrial Rate : 104 BPM

   P-R Int : 148 ms          QRS Dur : 082 ms

    QT Int : 330 ms       P-R-T Axes : 022 061 -45 degrees

   QTc Int : 433 ms

 

SINUS TACHYCARDIA

T WAVE ABNORMALITY, CONSIDER INFEROLATERAL ISCHEMIA

ABNORMAL ECG

WHEN COMPARED WITH ECG OF 17-SEP-2020 13:12,

NO SIGNIFICANT CHANGE WAS FOUND

Confirmed by DANIELLA KWONG MD ([id number]) on 10/4/2020 9:40:54 PM

 

Referred By: MD DUKE           Confirmed By:DANIELLA KWONG MD

## 2020-10-05 LAB
ANION GAP SERPL CALC-SCNC: 6 MMOL/L (ref 8–16)
BASOPHILS # BLD: 0.8 % (ref 0–2)
BUN SERPL-MCNC: 15.7 MG/DL (ref 7–18)
CALCIUM SERPL-MCNC: 8.5 MG/DL (ref 8.5–10.1)
CHLORIDE SERPL-SCNC: 110 MMOL/L (ref 98–107)
CO2 SERPL-SCNC: 26 MMOL/L (ref 21–32)
CREAT SERPL-MCNC: 0.9 MG/DL (ref 0.55–1.3)
DEPRECATED RDW RBC AUTO: 13.8 % (ref 11.9–15.9)
EOSINOPHIL # BLD: 4.6 % (ref 0–4.5)
GLUCOSE SERPL-MCNC: 87 MG/DL (ref 74–106)
HCT VFR BLD CALC: 42.1 % (ref 35.4–49)
HGB BLD-MCNC: 14.2 GM/DL (ref 11.7–16.9)
LYMPHOCYTES # BLD: 11.6 % (ref 8–40)
MAGNESIUM SERPL-MCNC: 2 MG/DL (ref 1.8–2.4)
MCH RBC QN AUTO: 29.6 PG (ref 25.7–33.7)
MCHC RBC AUTO-ENTMCNC: 33.7 G/DL (ref 32–35.9)
MCV RBC: 88 FL (ref 80–96)
MONOCYTES # BLD AUTO: 8.4 % (ref 3.8–10.2)
NEUTROPHILS # BLD: 74.6 % (ref 42.8–82.8)
PHOSPHATE SERPL-MCNC: 3 MG/DL (ref 2.5–4.9)
PLATELET # BLD AUTO: 233 K/MM3 (ref 134–434)
PMV BLD: 9.5 FL (ref 7.5–11.1)
POTASSIUM SERPLBLD-SCNC: 4 MMOL/L (ref 3.5–5.1)
RBC # BLD AUTO: 4.78 M/MM3 (ref 4–5.6)
SODIUM SERPL-SCNC: 141 MMOL/L (ref 136–145)
WBC # BLD AUTO: 7.9 K/MM3 (ref 4–10)

## 2020-10-05 RX ADMIN — LITHIUM CARBONATE SCH MG: 300 CAPSULE, GELATIN COATED ORAL at 09:41

## 2020-10-05 RX ADMIN — DOCUSATE SODIUM SCH MG: 100 CAPSULE, LIQUID FILLED ORAL at 22:13

## 2020-10-05 RX ADMIN — Medication SCH MG: at 10:54

## 2020-10-05 RX ADMIN — FOLIC ACID SCH MG: 1 TABLET ORAL at 09:41

## 2020-10-05 RX ADMIN — LITHIUM CARBONATE SCH MG: 300 CAPSULE, GELATIN COATED ORAL at 22:35

## 2020-10-05 RX ADMIN — ENOXAPARIN SODIUM SCH MG: 40 INJECTION SUBCUTANEOUS at 09:41

## 2020-10-05 NOTE — PN
Physical Exam: 


SUBJECTIVE: Patient seen and examined this morning, expressing active suicidal 

and homocidal ideation.  Displayed pleasant demeanor, was not aggressive or 

overly anxious. 








OBJECTIVE:





                                   Vital Signs











 Period  Temp  Pulse  Resp  BP Sys/[place]  Pulse Ox


 


 Last 24 Hr  97.8 F-97.9 F  60-67  20-20  102-123/53-62  96-97











GENERAL: The patient is awake, alert, and fully oriented


HEAD: Normal with no signs of trauma.


EYES: PERRL, extraocular movements intact, sclera anicteric, conjunctiva clear. 

No ptosis. 


NECK: Trachea midline, full range of motion, supple. 


LUNGS: Breath sounds equal, clear to auscultation bilaterally, no wheezes, no 

crackles, no accessory muscle use. 


HEART: Regular rate and rhythm, S1, S2 without murmur, rub or gallop.


ABDOMEN: Soft, nontender, nondistended, normoactive bowel sounds


EXTREMITIES: 2+ pulses, warm, well-perfused, no edema. 


NEUROLOGICAL: Normal speech, gait not observed.


PSYCH: Sad affect, depressed mood


SKIN: Warm, dry, normal turgor, scars noted on R femur 2/2 surgery, L wrist 2/2 

cutting 














                         Laboratory Results - last 24 hr





                                    CBC, BMP





                                 10/05/20 10:00 





                                 10/05/20 10:00 








Active Medications











Generic Name Dose Route Start Last Admin





  Trade Name Freq  PRN Reason Stop Dose Admin


 


Acetaminophen  650 mg  10/03/20 15:39  10/03/20 17:24





  Tylenol -  PO   650 mg





  Q6H PRN   Administration





  PAIN LEVEL 4 - 6  


 


Chlordiazepoxide HCl  10 mg  10/05/20 05:00  10/05/20 10:55





  Librium -  PO  10/05/20 23:01  10 mg





  [id number] MAGGIE   Administration


 


Chlordiazepoxide HCl  10 mg  10/06/20 05:00 





  Librium -  PO  10/06/20 17:01 





  Q12H MAGGIE  


 


Chlordiazepoxide HCl  10 mg  10/05/20 00:00 





  Librium -  PO  10/06/20 00:00 





  Q4H PRN  





  WITHDRAWAL(CONT SUBST)  


 


Chlordiazepoxide HCl  10 mg  10/07/20 05:00 





  Librium -  PO  10/07/20 05:01 





  ONCE@0500 ONE  


 


Enoxaparin Sodium  40 mg  10/03/20 16:30  10/05/20 09:41





  Lovenox -  SQ   40 mg





  DAILY MAGGIE   Administration


 


Folic Acid  1 mg  10/03/20 10:00  10/05/20 09:41





  Folic Acid -  PO   1 mg





  DAILY MAGGIE   Administration


 


Lithium Carbonate  300 mg  10/03/20 22:00  10/05/20 09:41





  Eskalith -  PO   300 mg





  BID MAGGIE   Administration


 


Risperidone  1 mg  10/03/20 22:00  10/05/20 09:41





  Risperdal -  PO   1 mg





  BID MAGGIE   Administration


 


Thiamine HCl  100 mg  10/03/20 10:00  10/05/20 10:54





  Vitamin B1 -  PO   100 mg





  DAILY MAGGIE   Administration











ASSESSMENT/PLAN:


This is a 46 y/o man with a significant medical history of Bipolar Disorder, 

Schizoaffective Disorder, Alcohol Abuse, recent admission to [facility] for 

Detox- 9/17-9/20 presenting for left hip pain 2/2 fall.





BIPOLAR DISORDER w/SEVERE DEPRESSION w/ SUICIDAL IDEATION & PSYCHOTIC FEATURES


- c/w Lithium 300mg BID


- c/w Risperidone 1mg BID


- c/w 1:1 2/2 suicidal/homocidal ideation


- 2 PC form signed and completed in chart


- Social Work (Susan) working to transfer to Inpatient Psych





RESOLVED:  SIRS 


-Vital Signs stable


-No WBC


-Blood Cx currently negative


-Urine Cx currently negative





REPORTED EPISODE OF HEMATOCHEZIA


-No reported bowel movements


-Start Colace 100mg BID for constipation


-FU GI on DC





Hx of RECENT FALL r/o MECHANICAL vs DRUG INDUCED


-CT Head/Spine/Pelvis:  No acute fracture


-XR Knee:  No acute fracture


-Gait not tested 2/2 affect/somnolence


-FU PT if possible on 1:1





ALCOHOL WITHDRAWAL


- Remains anxious, tremulous


- c/w Librium protocol - Day 3 


- c/w Thiamine 100mg Qdaily


- c/w Folic Acid 1mg qDaily





PPx


-DVT:  Lovenox 40mg





DISPO


-continue to monitor or 1:1, w/ goal of transfer to inpatient psych.  Patient 

may NOT leave AMA.





Visit type





- Emergency Visit


Emergency Visit: No





- New Patient


This patient is new to me today: Yes


Date on this admission: 10/05/20





- Critical Care


Critical Care patient: No





- Discharge Referral


Referred to [facility] No





ATTENDING PHYSICIAN STATEMENT





I saw and evaluated the patient.


I reviewed the resident's note and discussed the case with the resident.


I agree with the resident's findings and plan as documented.








SUBJECTIVE:








OBJECTIVE:








ASSESSMENT AND PLAN:

## 2020-10-05 NOTE — PN
Progress Note, Physician


Chief Complaint: 


Called to see Mr Hunter jackson am on 8th floor of [facility]. Client is maintained 

on Constant Observation for acute suicidal and Homicidal ideation. Client 

verbalized that " i have no will to live". Client has a plan to sit on tracks or

jump in front of oncoming amtrack Train. 





Client is re started on Risperidal and Lithium. 





- Current Medication List


Current Medications: 


Active Medications





Acetaminophen (Tylenol -)  650 mg PO Q6H PRN


   PRN Reason: PAIN LEVEL 4 - 6


   Last Admin: 10/03/20 17:24 Dose:  650 mg


   Documented by: 


Chlordiazepoxide HCl (Librium -)  10 mg PO H1O-[facility]


   Stop: 10/05/20 23:01


   Last Admin: 10/05/20 10:55 Dose:  10 mg


   Documented by: 


Chlordiazepoxide HCl (Librium -)  10 mg PO Q12H [facility]


   Stop: 10/06/20 17:01


Chlordiazepoxide HCl (Librium -)  10 mg PO Q4H PRN


   PRN Reason: WITHDRAWAL(CONT SUBST)


   Stop: 10/06/20 00:00


Chlordiazepoxide HCl (Librium -)  10 mg PO ONCE@0500 ONE


   Stop: 10/07/20 05:01


Enoxaparin Sodium (Lovenox -)  40 mg SQ DAILY [facility]


   Last Admin: 10/05/20 09:41 Dose:  40 mg


   Documented by: 


Folic Acid (Folic Acid -)  1 mg PO DAILY [facility]


   Last Admin: 10/05/20 09:41 Dose:  1 mg


   Documented by: 


Lithium Carbonate (Eskalith -)  300 mg PO BID [facility]


   Last Admin: 10/05/20 09:41 Dose:  300 mg


   Documented by: 


Risperidone (Risperdal -)  1 mg PO BID [facility]


   Last Admin: 10/05/20 09:41 Dose:  1 mg


   Documented by: 


Thiamine HCl (Vitamin B1 -)  100 mg PO DAILY [facility]


   Last Admin: 10/05/20 10:54 Dose:  100 mg


   Documented by: 











- Objective


Vital Signs: 


                                   Vital Signs











Temperature  97.9 F   10/05/20 06:00


 


Pulse Rate  60   10/05/20 06:00


 


Respiratory Rate  20   10/05/20 09:00


 


Blood Pressure  102/62   10/05/20 06:00


 


O2 Sat by Pulse Oximetry (%)  96   10/05/20 09:00














Problem List





- Problems


(1) Bipolar disorder


Code(s): F31.9 - BIPOLAR DISORDER, UNSPECIFIED   


Qualifiers: 


   Active/Remission status: currently active   Current bipolar episode type: 

depressed   Psychotic features: with psychotic features 





(2) Suicidal ideations


Code(s): R45.851 - SUICIDAL IDEATIONS   





Assessment/Plan





Renew order for Constant Observation for safety on inpatient unit.


[place] level in am prior to taking am dose.


On Librium taper currently.


Signed InvoluntARY 9.27 MENTAL hYGIENE LAW FORM, PLACED IN PATIENT FILRE.


Spoke with   Veronica diamond and RN on Unit.

## 2020-10-05 NOTE — PN
Teaching Attending Note


Name of Resident: Darryl Rios





ATTENDING PHYSICIAN STATEMENT





I saw and evaluated the patient.


I reviewed the resident's note and discussed the case with the resident.


I agree with the resident's findings and plan as documented.








SUBJECTIVE:





 Feels " rough" . has SI . painin Legs . no diarhea, no hematochezia 





OBJECTIVE:


NAD, calm , flat affect, MMM


CV : RRR, no MRG 


Lungs: CTAB 


Abd: soft, ND, NT, ND , NL BS 


Ext : No edema or erythema on upper or lower extremities.














ASSESSMENT AND PLAN:





44 y/o man with h/o bioplar and depression and several suicidal attempts , 

withdrawal seizures, ETOH abuse, and non compliance with psych meds who  

presented with SI . 





1- SI , severe depression with psychotic features, and Bipolar Do


- CONT respirdal and Lithium 


- lithium level in AM 


- cont 1: 1 








2- leukocytosis , resolved. cxs neg to date 





3- Reported hematochezia .  no BM here. Hb stable . f/u with GI at dc 


4- s/p Fall : 


- No fx on  pelvis CT and R knee xray 





5- ETOH withdrawal : 


- cont librium . last dose tomorrow at 5 pm 


- cont thiamine and Folic 





-DVT PX: lovenox  





Cont 1:1. Can NOT leave AMA





 Needs to complete his detox. last dose librium 5 tomorrow . then can be 

transferred to psych .

## 2020-10-06 LAB
ANION GAP SERPL CALC-SCNC: 6 MMOL/L (ref 8–16)
BASOPHILS # BLD: 0.8 % (ref 0–2)
BUN SERPL-MCNC: 16.3 MG/DL (ref 7–18)
CALCIUM SERPL-MCNC: 8.2 MG/DL (ref 8.5–10.1)
CHLORIDE SERPL-SCNC: 106 MMOL/L (ref 98–107)
CO2 SERPL-SCNC: 27 MMOL/L (ref 21–32)
CREAT SERPL-MCNC: 1 MG/DL (ref 0.55–1.3)
DEPRECATED RDW RBC AUTO: 13.5 % (ref 11.9–15.9)
EOSINOPHIL # BLD: 5 % (ref 0–4.5)
GLUCOSE SERPL-MCNC: 103 MG/DL (ref 74–106)
HCT VFR BLD CALC: 40.6 % (ref 35.4–49)
HGB BLD-MCNC: 13.8 GM/DL (ref 11.7–16.9)
LYMPHOCYTES # BLD: 15.7 % (ref 8–40)
MAGNESIUM SERPL-MCNC: 2 MG/DL (ref 1.8–2.4)
MCH RBC QN AUTO: 29.8 PG (ref 25.7–33.7)
MCHC RBC AUTO-ENTMCNC: 34.1 G/DL (ref 32–35.9)
MCV RBC: 87.3 FL (ref 80–96)
MONOCYTES # BLD AUTO: 8.2 % (ref 3.8–10.2)
NEUTROPHILS # BLD: 70.3 % (ref 42.8–82.8)
PLATELET # BLD AUTO: 234 K/MM3 (ref 134–434)
PMV BLD: 9.7 FL (ref 7.5–11.1)
POTASSIUM SERPLBLD-SCNC: 4.1 MMOL/L (ref 3.5–5.1)
RBC # BLD AUTO: 4.65 M/MM3 (ref 4–5.6)
SODIUM SERPL-SCNC: 139 MMOL/L (ref 136–145)
WBC # BLD AUTO: 8.3 K/MM3 (ref 4–10)

## 2020-10-06 RX ADMIN — Medication SCH MG: at 09:08

## 2020-10-06 RX ADMIN — ENOXAPARIN SODIUM SCH MG: 40 INJECTION SUBCUTANEOUS at 09:09

## 2020-10-06 RX ADMIN — DOCUSATE SODIUM SCH MG: 100 CAPSULE, LIQUID FILLED ORAL at 09:07

## 2020-10-06 RX ADMIN — LITHIUM CARBONATE SCH MG: 300 CAPSULE, GELATIN COATED ORAL at 21:36

## 2020-10-06 RX ADMIN — ACETAMINOPHEN PRN MG: 325 TABLET ORAL at 06:36

## 2020-10-06 RX ADMIN — DOCUSATE SODIUM SCH MG: 100 CAPSULE, LIQUID FILLED ORAL at 21:36

## 2020-10-06 RX ADMIN — FOLIC ACID SCH MG: 1 TABLET ORAL at 09:08

## 2020-10-06 RX ADMIN — LITHIUM CARBONATE SCH MG: 300 CAPSULE, GELATIN COATED ORAL at 09:08

## 2020-10-06 NOTE — PN
Teaching Attending Note


Name of Resident: Darryl Rios





ATTENDING PHYSICIAN STATEMENT





I saw and evaluated the patient.


I reviewed the resident's note and discussed the case with the resident.


I agree with the resident's findings and plan as documented.








SUBJECTIVE:


Patient has no new complains. 


OBJECTIVE:


                                   Vital Signs











Temperature  97.9 F   10/06/20 06:53


 


Pulse Rate  59 L  10/06/20 06:53


 


Respiratory Rate  20   10/06/20 06:53


 


Blood Pressure  120/65   10/06/20 06:53


 


O2 Sat by Pulse Oximetry (%)  96   10/06/20 06:53





PE; per resident's note


                                      CBCD











WBC  8.3 K/mm3 (4.0-10.0)   10/06/20  07:05    


 


RBC  4.65 M/mm3 (4.00-5.60)   10/06/20  07:05    


 


Hgb  13.8 GM/dL (11.7-16.9)   10/06/20  07:05    


 


Hct  40.6 % (35.4-49)   10/06/20  07:05    


 


MCV  87.3 fl (80-96)   10/06/20  07:05    


 


MCHC  34.1 g/dl (32.0-35.9)   10/06/20  07:05    


 


RDW  13.5 % (11.9-15.9)   10/06/20  07:05    


 


Plt Count  234 K/MM3 (134-434)   10/06/20  07:05    


 


MPV  9.7 fl (7.5-11.1)   10/06/20  07:05    








                                       CMP











Sodium  139 mmol/L (136-145)   10/06/20  07:05    


 


Potassium  4.1 mmol/L (3.5-5.1)   10/06/20  07:05    


 


Chloride  106 mmol/L ()   10/06/20  07:05    


 


Carbon Dioxide  27 mmol/L (21-32)   10/06/20  07:05    


 


Anion Gap  6 MMOL/L (8-16)  L  10/06/20  07:05    


 


BUN  16.3 mg/dL (7-18)   10/06/20  07:05    


 


Creatinine  1.0 mg/dL (0.55-1.3)   10/06/20  07:05    


 


Random Glucose  103 mg/dL ()   10/06/20  07:05    


 


Calcium  8.2 mg/dL (8.5-10.1)  L  10/06/20  07:05    


 


Total Bilirubin  1.0 mg/dL (0.2-1)   10/03/20  06:59    


 


AST  18 U/L (15-37)   10/03/20  06:59    


 


ALT  39 U/L (13-61)   10/03/20  06:59    


 


Alkaline Phosphatase  101 U/L ()   10/03/20  06:59    


 


Total Protein  7.1 g/dl (6.4-8.2)   10/03/20  06:59    


 


Albumin  3.5 g/dl (3.4-5.0)   10/03/20  06:59    








                                 CARDIAC ENZYMES











Creatine Kinase  235 U/L ()   10/02/20  23:00    


 


Troponin I  < 0.03 ng/ml (0.00-0.05)   10/02/20  23:00    








                               Current Medications











Generic Name Dose Route Start Last Admin





  Trade Name Freq  PRN Reason Stop Dose Admin


 


Acetaminophen  650 mg  10/03/20 15:39  10/06/20 06:36





  Tylenol -  PO   650 mg





  Q6H PRN   Administration





  PAIN LEVEL 4 - 6  


 


Chlordiazepoxide HCl  10 mg  10/07/20 05:00 





  Librium -  PO  10/07/20 05:01 





  ONCE@0500 ONE  


 


Chlordiazepoxide HCl  10 mg  10/06/20 05:15  10/06/20 05:30





  Librium -  PO  10/06/20 17:16  10 mg





  Q12H MAGGIE   Administration


 


Docusate Sodium  100 mg  10/05/20 22:00  10/06/20 09:07





  Colace -  PO   100 mg





  BID MAGGIE   Administration


 


Enoxaparin Sodium  40 mg  10/03/20 16:30  10/06/20 09:09





  Lovenox -  SQ   40 mg





  DAILY MAGGIE   Administration


 


Folic Acid  1 mg  10/03/20 10:00  10/06/20 09:08





  Folic Acid -  PO   1 mg





  DAILY MAGGIE   Administration


 


Lithium Carbonate  300 mg  10/03/20 22:00  10/06/20 09:08





  Eskalith -  PO   300 mg





  BID MAGGIE   Administration


 


Risperidone  1 mg  10/03/20 22:00  10/06/20 09:07





  Risperdal -  PO   1 mg





  BID MAGGIE   Administration


 


Thiamine HCl  100 mg  10/03/20 10:00  10/06/20 09:08





  Vitamin B1 -  PO   100 mg





  DAILY MAGGIE   Administration








                                Home Medications











 Medication  Instructions  Recorded


 


NK [No Known Home Medication]  09/17/20








                                  Microbiology





10/02/20 22:50   Blood - Peripheral Venous   Blood Culture - Preliminary


                            NO GROWTH OBTAINED AFTER 72 HOURS, INCUBATION TO 

CONTINUE


                            FOR 2 DAYS.


10/02/20 22:45   Blood - Peripheral Venous   Blood Culture - Preliminary


                            NO GROWTH OBTAINED AFTER 72 HOURS, INCUBATION TO 

CONTINUE


                            FOR 2 DAYS.


10/02/20 22:50   Urine - Urine Clean Catch   Urine Culture - Final


                            NO GROWTH OBTAINED








ASSESSMENT AND PLAN:


Patient is a 45yom with Pmhx of bioplar and depression and several suicidal 

attempts , withdrawal seizures, ETOH abuse, and non compliance with psych meds 

who presented with Suicidal ideation. . 





# Acute Suicidal ideation 


# severe depression with psychotic features


# Bipolar Do: on respirdal and Lithium continue , check lithium level , cont 1: 

1   


# leukocytosis , resolved. cxs neg to date 


# Reported hematochezia .  H/H stable , outpatient GI F/u 


# s/p Fall :  No fx on  pelvis CT and R knee xray 


# ETOH withdrawal :  cont librium . last dose tomorrow at 5 pm, cont thiamine 

and Folic 


   DVT PX: lovenox 


   Cont 1:1. Can NOT leave AMA





 Needs to complete his detox. last dose librium today, then can be transferred 

to psych . 


waiting for bed in psych facility

## 2020-10-06 NOTE — PN
Physical Exam: 


SUBJECTIVE: Patient seen and examined, continues to endorse homicidal and 

suicidal ideation.  Endorses hearing internal auditory stimuli. Denies feelings 

of aggression towards medical team.  Pleasant in demeanor and non-threatening.  








OBJECTIVE:





                                   Vital Signs











 Period  Temp  Pulse  Resp  BP Sys/Aguayo  Pulse Ox


 


 Last 24 Hr  97.9 F-98.6 F  59-72  20-20  104-120/59-66  96-98











GENERAL: The patient is awake, alert, and fully oriented


HEAD: Normal with no signs of trauma.


EYES: PERRL, extraocular movements intact, sclera anicteric, conjunctiva clear. 

No ptosis. 


NECK: Trachea midline, full range of motion, supple. 


LUNGS: Breath sounds equal, clear to auscultation bilaterally, no wheezes, no 

crackles, no accessory muscle use. 


HEART: Regular rate and rhythm, S1, S2 without murmur, rub or gallop.


ABDOMEN: Soft, nontender, nondistended, normoactive bowel sounds


EXTREMITIES: 2+ pulses, warm, well-perfused, no edema. 


NEUROLOGICAL: Normal speech, gait not observed.


PSYCH: Sad affect, depressed mood


SKIN: Warm, dry, normal turgor, scars noted on R femur 2/2 surgery, L wrist 2/2 

cutting 














                         Laboratory Results - last 24 hr





                                    CBC, BMP





                                 10/06/20 07:05 





                                 10/06/20 07:05 








Active Medications











Generic Name Dose Route Start Last Admin





  Trade Name Freq  PRN Reason Stop Dose Admin


 


Acetaminophen  650 mg  10/03/20 15:39  10/06/20 06:36





  Tylenol -  PO   650 mg





  Q6H PRN   Administration





  PAIN LEVEL 4 - 6  


 


Chlordiazepoxide HCl  10 mg  10/07/20 05:00 





  Librium -  PO  10/07/20 05:01 





  ONCE@0500 ONE  


 


Chlordiazepoxide HCl  10 mg  10/06/20 05:15  10/06/20 05:30





  Librium -  PO  10/06/20 17:16  10 mg





  Q12H MAGGIE   Administration


 


Docusate Sodium  100 mg  10/05/20 22:00  10/06/20 09:07





  Colace -  PO   100 mg





  BID MAGGIE   Administration


 


Enoxaparin Sodium  40 mg  10/03/20 16:30  10/06/20 09:09





  Lovenox -  SQ   40 mg





  DAILY MAGGIE   Administration


 


Folic Acid  1 mg  10/03/20 10:00  10/06/20 09:08





  Folic Acid -  PO   1 mg





  DAILY MAGGIE   Administration


 


Lithium Carbonate  300 mg  10/03/20 22:00  10/06/20 09:08





  Eskalith -  PO   300 mg





  BID MAGGIE   Administration


 


Risperidone  1 mg  10/03/20 22:00  10/06/20 09:07





  Risperdal -  PO   1 mg





  BID MAGGIE   Administration


 


Thiamine HCl  100 mg  10/03/20 10:00  10/06/20 09:08





  Vitamin B1 -  PO   100 mg





  DAILY MAGGIE   Administration











ASSESSMENT/PLAN:


44 yo M with PMHx of Bipolar Disorder, Schizoaffective Disorder, Alcohol Abuse, 

recent admission to [facility] for Detox- 9/17-9/20 presenting for left hip pain 

2/2 fall.





BIPOLAR DISORDER w/SEVERE DEPRESSION w/ SUICIDAL IDEATION & PSYCHOTIC FEATURES


- c/w Lithium 300mg BID


- c/w Risperidone 1mg BID


- c/w 1:1 2/2 suicidal/homicidal ideation


- 2 PC form signed and completed in chart


- Social Work (Susan) working to transfer to Inpatient Psych





RESOLVED:  SIRS 


-Vital Signs stable


-No WBC


-Blood Cx currently negative


-Urine Cx currently negative





REPORTED EPISODE OF HEMATOCHEZIA


-BM reported overnight, denied hematochezia


-c/w Colace 100mg BID for constipation


-FU GI on DC





Hx of RECENT FALL r/o MECHANICAL vs DRUG INDUCED


-CT Head/Spine/Pelvis:  No acute fracture


-XR Knee:  No acute fracture


-PT:  Full weight bearing w/ wide based gait.  c/w PT. 





ALCOHOL WITHDRAWAL


- Remains anxious, tremulous


- c/w Librium protocol - Day 4 


- c/w Thiamine 100mg Qdaily


- c/w Folic Acid 1mg qDaily





PPx


-DVT:  Lovenox 40mg





DISPO


-continue to monitor or 1:1, w/ goal of transfer to inpatient psych.  Patient 

may NOT leave AMA.








Visit type





- Emergency Visit


Emergency Visit: No





- New Patient


This patient is new to me today: No





- Critical Care


Critical Care patient: No





- Discharge Referral


Referred to [facility] No





ATTENDING PHYSICIAN STATEMENT





I saw and evaluated the patient.


I reviewed the resident's note and discussed the case with the resident.


I agree with the resident's findings and plan as documented.








SUBJECTIVE:








OBJECTIVE:








ASSESSMENT AND PLAN:

## 2020-10-07 LAB
ANION GAP SERPL CALC-SCNC: 8 MMOL/L (ref 8–16)
BASOPHILS # BLD: 0.9 % (ref 0–2)
BUN SERPL-MCNC: 14.9 MG/DL (ref 7–18)
CALCIUM SERPL-MCNC: 8.7 MG/DL (ref 8.5–10.1)
CHLORIDE SERPL-SCNC: 110 MMOL/L (ref 98–107)
CO2 SERPL-SCNC: 25 MMOL/L (ref 21–32)
CREAT SERPL-MCNC: 0.9 MG/DL (ref 0.55–1.3)
DEPRECATED RDW RBC AUTO: 13.5 % (ref 11.9–15.9)
EOSINOPHIL # BLD: 5.3 % (ref 0–4.5)
GLUCOSE SERPL-MCNC: 85 MG/DL (ref 74–106)
HCT VFR BLD CALC: 42 % (ref 35.4–49)
HGB BLD-MCNC: 14 GM/DL (ref 11.7–16.9)
LYMPHOCYTES # BLD: 16.2 % (ref 8–40)
MAGNESIUM SERPL-MCNC: 2 MG/DL (ref 1.8–2.4)
MCH RBC QN AUTO: 29.2 PG (ref 25.7–33.7)
MCHC RBC AUTO-ENTMCNC: 33.4 G/DL (ref 32–35.9)
MCV RBC: 87.3 FL (ref 80–96)
MONOCYTES # BLD AUTO: 8 % (ref 3.8–10.2)
NEUTROPHILS # BLD: 69.6 % (ref 42.8–82.8)
PLATELET # BLD AUTO: 248 K/MM3 (ref 134–434)
PMV BLD: 9.3 FL (ref 7.5–11.1)
POTASSIUM SERPLBLD-SCNC: 4.2 MMOL/L (ref 3.5–5.1)
RBC # BLD AUTO: 4.81 M/MM3 (ref 4–5.6)
SODIUM SERPL-SCNC: 143 MMOL/L (ref 136–145)
WBC # BLD AUTO: 8.3 K/MM3 (ref 4–10)

## 2020-10-07 RX ADMIN — FOLIC ACID SCH MG: 1 TABLET ORAL at 09:12

## 2020-10-07 RX ADMIN — DOCUSATE SODIUM SCH MG: 100 CAPSULE, LIQUID FILLED ORAL at 09:12

## 2020-10-07 RX ADMIN — ENOXAPARIN SODIUM SCH MG: 40 INJECTION SUBCUTANEOUS at 09:18

## 2020-10-07 RX ADMIN — LITHIUM CARBONATE SCH MG: 300 CAPSULE, GELATIN COATED ORAL at 17:26

## 2020-10-07 RX ADMIN — LITHIUM CARBONATE SCH MG: 300 CAPSULE, GELATIN COATED ORAL at 22:16

## 2020-10-07 RX ADMIN — DOCUSATE SODIUM SCH MG: 100 CAPSULE, LIQUID FILLED ORAL at 22:16

## 2020-10-07 RX ADMIN — Medication SCH MG: at 09:12

## 2020-10-07 NOTE — PN
Teaching Attending Note


Name of Resident: Darryl Rios





ATTENDING PHYSICIAN STATEMENT





I saw and evaluated the patient.


I reviewed the resident's note and discussed the case with the resident.


I agree with the resident's findings and plan as documented.








SUBJECTIVE:


Patient is comfortable with NAD 





OBJECTIVE:


                                   Vital Signs











Temperature  91.6 F L  10/07/20 14:00


 


Pulse Rate  63   10/07/20 14:00


 


Respiratory Rate  20   10/07/20 14:00


 


Blood Pressure  118/53 L  10/07/20 14:00


 


O2 Sat by Pulse Oximetry (%)  96   10/07/20 14:00








PE;per resident's note





                                      CBCD











WBC  8.3 K/mm3 (4.0-10.0)   10/07/20  06:29    


 


RBC  4.81 M/mm3 (4.00-5.60)   10/07/20  06:29    


 


Hgb  14.0 GM/dL (11.7-16.9)   10/07/20  06:29    


 


Hct  42.0 % (35.4-49)   10/07/20  06:29    


 


MCV  87.3 fl (80-96)   10/07/20  06:29    


 


MCHC  33.4 g/dl (32.0-35.9)   10/07/20  06:29    


 


RDW  13.5 % (11.9-15.9)   10/07/20  06:29    


 


Plt Count  248 K/MM3 (134-434)   10/07/20  06:29    


 


MPV  9.3 fl (7.5-11.1)   10/07/20  06:29    








                                       CMP











Sodium  143 mmol/L (136-145)   10/07/20  06:29    


 


Potassium  4.2 mmol/L (3.5-5.1)   10/07/20  06:29    


 


Chloride  110 mmol/L ()  H  10/07/20  06:29    


 


Carbon Dioxide  25 mmol/L (21-32)   10/07/20  06:29    


 


Anion Gap  8 MMOL/L (8-16)   10/07/20  06:29    


 


BUN  14.9 mg/dL (7-18)   10/07/20  06:29    


 


Creatinine  0.9 mg/dL (0.55-1.3)   10/07/20  06:29    


 


Random Glucose  85 mg/dL ()   10/07/20  06:29    


 


Calcium  8.7 mg/dL (8.5-10.1)   10/07/20  06:29    


 


Total Bilirubin  1.0 mg/dL (0.2-1)   10/03/20  06:59    


 


AST  18 U/L (15-37)   10/03/20  06:59    


 


ALT  39 U/L (13-61)   10/03/20  06:59    


 


Alkaline Phosphatase  101 U/L ()   10/03/20  06:59    


 


Total Protein  7.1 g/dl (6.4-8.2)   10/03/20  06:59    


 


Albumin  3.5 g/dl (3.4-5.0)   10/03/20  06:59    








                                 CARDIAC ENZYMES











Creatine Kinase  235 U/L ()   10/02/20  23:00    


 


Troponin I  < 0.03 ng/ml (0.00-0.05)   10/02/20  23:00    








                               Current Medications











Generic Name Dose Route Start Last Admin





  Trade Name Freq  PRN Reason Stop Dose Admin


 


Acetaminophen  650 mg  10/03/20 15:39  10/06/20 06:36





  Tylenol -  PO   650 mg





  Q6H PRN   Administration





  PAIN LEVEL 4 - 6  


 


Docusate Sodium  100 mg  10/05/20 22:00  10/07/20 09:12





  Colace -  PO   100 mg





  BID MAGGIE   Administration


 


Enoxaparin Sodium  40 mg  10/03/20 16:30  10/07/20 09:18





  Lovenox -  SQ   40 mg





  DAILY MAGGIE   Administration


 


Folic Acid  1 mg  10/03/20 10:00  10/07/20 09:12





  Folic Acid -  PO   1 mg





  DAILY MAGGIE   Administration


 


Lithium Carbonate  300 mg  10/03/20 22:00  10/07/20 17:26





  Eskalith -  PO   300 mg





  BID MAGGIE   Administration


 


Risperidone  1 mg  10/03/20 22:00  10/07/20 09:12





  Risperdal -  PO   1 mg





  BID MAGGIE   Administration


 


Thiamine HCl  100 mg  10/03/20 10:00  10/07/20 09:12





  Vitamin B1 -  PO   100 mg





  DAILY MAGGIE   Administration








                                Home Medications











 Medication  Instructions  Recorded


 


NK [No Known Home Medication]  09/17/20








                                  Microbiology





10/02/20 22:50   Blood - Peripheral Venous   Blood Culture - Preliminary


                            NO GROWTH OBTAINED AFTER 96 HOURS, INCUBATION TO 

CONTINUE


                            FOR 1 DAYS.


10/02/20 22:45   Blood - Peripheral Venous   Blood Culture - Preliminary


                            NO GROWTH OBTAINED AFTER 96 HOURS, INCUBATION TO 

CONTINUE


                            FOR 1 DAYS.


10/02/20 22:50   Urine - Urine Clean Catch   Urine Culture - Final


                            NO GROWTH OBTAINED





ASSESSMENT AND PLAN:


Patient is a 45yom with Pmhx of bioplar and depression and several suicidal 

attempts , withdrawal seizures, ETOH abuse, and non compliance with psych meds 

who presented with Suicidal ideation. . 





# Acute Suicidal ideation : Dr Roblero is on the case , discussed with SW, 

trying to arrange a bed


# severe depression with psychotic features


# Bipolar Do: on respirdal and Lithium continue , check lithium level , cont 1: 

1   


# leukocytosis , resolved. cxs neg to date 


# Reported hematochezia .  H/H stable , outpatient GI F/u 


# s/p Fall :  No fx on  pelvis CT and R knee xray 


# ETOH withdrawal :  cont librium . last dose tomorrow at 5 pm, cont thiamine 

and Folic 


   DVT PX: lovenox 


   Cont 1:1. Can NOT leave AMA





completed detox. can be transferred  transferred to psych . 


waiting for bed in psych facility 63M with a history of HTN, hypothyroidism, nephrolithiasis who presented after fall with a R patellar fracture, now POD 2 R patellar ORIF.     # R patellar fracture, POD 2 ORIF  - Overall, doing well post-op. Working with PT. Plan is for discharge home tomorrow with home PT.   - Encourage OOB  - DVT ppx per primary team. Currently on  BID.   - Recc standing bowel regimen such as miralax 17g qd while on opiates  - Pain control per primary team. Counseled on importance of strict abstinence from etoh while on narcotics. Risks include respiratory depression and death. Denies CAGE questions. If plan is for discharge with opiates, would iSTOP and consider getting collateral from PMD to exclude known history of substance misuse.      # Essential hypertension  - BP slightly labile but mostly normotensive. Pain likely contributing to elevated BPs.   - Would continue with amlodipine 10mg qd    # Hypothyroidism  - Continue with synthroid 88mcg qd 63M with a history of HTN, hypothyroidism, nephrolithiasis who presented after fall with a R patellar fracture, now POD 2 R patellar ORIF.     # R patellar fracture, POD 2 ORIF  - Overall, doing well post-op. Working with PT. Plan is for discharge home tomorrow with home PT.   - Encourage OOB  - DVT ppx per primary team. Currently on  BID.   - Agree with standing bowel regimen while on opiates  - Pain control per primary team. Counseled on importance of strict abstinence from etoh while on narcotics. Risks include respiratory depression and death. Denies CAGE questions. If plan is for discharge with opiates, would iSTOP and consider getting collateral from PMD to exclude known history of substance misuse.      # Essential hypertension  - BP slightly labile but mostly normotensive. Pain likely contributing to elevated BPs.   - Would continue with amlodipine 10mg qd  - Consider d/c IVF if tolerating PO    # Hypothyroidism  - Continue with synthroid 88mcg qd 63M with a history of HTN, hypothyroidism, nephrolithiasis who presented after fall with a R patellar fracture, now POD 2 R patellar ORIF.     # R patellar fracture, POD 2 ORIF  - Overall, doing well post-op. Working with PT. Plan is for discharge home tomorrow with home PT.   - Encourage OOB  - DVT ppx per primary team. Currently on  BID.   - Agree with standing bowel regimen while on opiates  - Pain control per primary team. Counseled on importance of strict abstinence from etoh while on narcotics and benzos. Risks include respiratory depression and death. Denies CAGE questions. If plan is for discharge with opiates or benzos, would iSTOP and consider getting collateral from PMD to exclude known history of substance misuse.      # Essential hypertension  - BP slightly labile but mostly normotensive. Pain likely contributing to elevated BPs.   - Would continue with amlodipine 10mg qd  - Consider d/c IVF if tolerating PO    # Hypothyroidism  - Continue with synthroid 88mcg qd 63M with a history of HTN, hypothyroidism, nephrolithiasis who presented after fall with a R patellar fracture, now POD 2 R patellar ORIF.     # R patellar fracture, POD 2 ORIF  - Overall, doing well post-op. Working with PT. Plan is for discharge home tomorrow with home PT.   - Encourage OOB  - DVT ppx per primary team. Currently on  BID.   - Agree with standing bowel regimen while on opiates  - Pain control per primary team. Counseled on importance of strict abstinence from etoh while on narcotics and benzos. Risks include respiratory depression and death. Denies CAGE questions. If plan is for discharge with opiates or benzos, would iSTOP and consider getting collateral from PMD to exclude known history of substance misuse.      # Essential hypertension  - BP slightly labile but mostly normotensive. Pain likely contributing to elevated BPs.   - Would continue with amlodipine 10mg qd  - D/c IVF if tolerating PO    # Hypothyroidism  - Continue with synthroid 88mcg qd

## 2020-10-07 NOTE — PN
Physical Exam: 


SUBJECTIVE: Patient seen and examined this morning, no longer endorsing 

homicidal or suicidal ideation.  No longer endorsing auditory hallucinations.   

Ptn states he feels better. 








OBJECTIVE:





                                   Vital Signs











 Period  Temp  Pulse  Resp  BP Sys/Aguayo  Pulse Ox


 


 Last 24 Hr  97.6 F-98.7 F  58-77  20-20  110-124/55-61  96-97











GENERAL: The patient is awake, alert, and fully oriented


HEAD: Normal with no signs of trauma.


EYES: PERRL, extraocular movements intact, sclera anicteric, conjunctiva clear. 

No ptosis. 


NECK: Trachea midline, full range of motion, supple. 


LUNGS: Breath sounds equal, clear to auscultation bilaterally, no wheezes, no 

crackles, no accessory muscle use. 


HEART: Regular rate and rhythm, S1, S2 without murmur, rub or gallop.


ABDOMEN: Soft, nontender, nondistended, normoactive bowel sounds


EXTREMITIES: 2+ pulses, warm, well-perfused, no edema. 


NEUROLOGICAL: Normal speech, gait not observed.


PSYCH: Sad affect, depressed mood


SKIN: Warm, dry, normal turgor, scars noted on R femur 2/2 surgery, L wrist 2/2 

cutting 

















                         Laboratory Results - last 24 hr





                                    CBC, BMP





                                 10/07/20 06:29 





                                 10/07/20 06:29 








Active Medications











Generic Name Dose Route Start Last Admin





  Trade Name Floq  PRN Reason Stop Dose Admin


 


Acetaminophen  650 mg  10/03/20 15:39  10/06/20 06:36





  Tylenol -  PO   650 mg





  Q6H PRN   Administration





  PAIN LEVEL 4 - 6  


 


Docusate Sodium  100 mg  10/05/20 22:00  10/07/20 09:12





  Colace -  PO   100 mg





  BID MAGGIE   Administration


 


Enoxaparin Sodium  40 mg  10/03/20 16:30  10/07/20 09:18





  Lovenox -  SQ   40 mg





  DAILY MAGGIE   Administration


 


Folic Acid  1 mg  10/03/20 10:00  10/07/20 09:12





  Folic Acid -  PO   1 mg





  DAILY MAGGIE   Administration


 


Lithium Carbonate  300 mg  10/03/20 22:00  10/06/20 21:36





  Eskalith -  PO   300 mg





  BID MAGGIE   Administration


 


Risperidone  1 mg  10/03/20 22:00  10/07/20 09:12





  Risperdal -  PO   1 mg





  BID MAGGIE   Administration


 


Thiamine HCl  100 mg  10/03/20 10:00  10/07/20 09:12





  Vitamin B1 -  PO   100 mg





  DAILY MAGGIE   Administration











ASSESSMENT/PLAN:


46 yo M with PMHx of Bipolar Disorder, Schizoaffective Disorder, Alcohol Abuse, 

recent admission to [facility] for Detox- 9/17-9/20 presenting for left hip pain 

2/2 fall.





BIPOLAR DISORDER w/SEVERE DEPRESSION w/ SUICIDAL IDEATION & PSYCHOTIC FEATURES


- Ptn denied HI/SI or hallucinations today


- c/w Lithium 300mg BID


- c/w Risperidone 1mg BID


- c/w 1:1 2/2 suicidal/homicidal ideation


- 2 PC form signed and completed in chart


- Social Work (Susan) working to transfer to Inpatient Psych





REPORTED EPISODE OF HEMATOCHEZIA


-c/w Colace 100mg BID for constipation


-Add Miralax as needed 


-FU GI on DC





Hx of RECENT FALL r/o MECHANICAL vs DRUG INDUCED


-CT Head/Spine/Pelvis:  No acute fracture


-XR Knee:  No acute fracture


-PT:  Full weight bearing w/ wide based gait.  c/w PT. 





ALCOHOL WITHDRAWAL


- Less anxious today


- c/w Librium protocol - Day 5 


- c/w Thiamine 100mg Qdaily


- c/w Folic Acid 1mg qDaily





PPx


-DVT:  Lovenox 40mg





DISPO


-continue to monitor or 1:1, w/ goal of transfer to inpatient psych.  Patient 

may NOT leave AMA.





Visit type





- Emergency Visit


Emergency Visit: No





- New Patient


This patient is new to me today: No





- Critical Care


Critical Care patient: No





- Discharge Referral


Referred to [facility] No





ATTENDING PHYSICIAN STATEMENT





I saw and evaluated the patient.


I reviewed the resident's note and discussed the case with the resident.


I agree with the resident's findings and plan as documented.








SUBJECTIVE:








OBJECTIVE:








ASSESSMENT AND PLAN:

## 2020-10-08 LAB
ANION GAP SERPL CALC-SCNC: 6 MMOL/L (ref 8–16)
BASOPHILS # BLD: 0.6 % (ref 0–2)
BUN SERPL-MCNC: 17.8 MG/DL (ref 7–18)
CALCIUM SERPL-MCNC: 8.6 MG/DL (ref 8.5–10.1)
CHLORIDE SERPL-SCNC: 107 MMOL/L (ref 98–107)
CO2 SERPL-SCNC: 26 MMOL/L (ref 21–32)
CREAT SERPL-MCNC: 0.9 MG/DL (ref 0.55–1.3)
DEPRECATED RDW RBC AUTO: 13.6 % (ref 11.9–15.9)
EOSINOPHIL # BLD: 5.5 % (ref 0–4.5)
GLUCOSE SERPL-MCNC: 83 MG/DL (ref 74–106)
HCT VFR BLD CALC: 42.4 % (ref 35.4–49)
HGB BLD-MCNC: 14.6 GM/DL (ref 11.7–16.9)
LYMPHOCYTES # BLD: 13.9 % (ref 8–40)
Lab: 2 UG/ML
MAGNESIUM SERPL-MCNC: 2.2 MG/DL (ref 1.8–2.4)
MCH RBC QN AUTO: 29.9 PG (ref 25.7–33.7)
MCHC RBC AUTO-ENTMCNC: 34.4 G/DL (ref 32–35.9)
MCV RBC: 87 FL (ref 80–96)
MONOCYTES # BLD AUTO: 8.4 % (ref 3.8–10.2)
NEUTROPHILS # BLD: 71.6 % (ref 42.8–82.8)
PLATELET # BLD AUTO: 241 K/MM3 (ref 134–434)
PMV BLD: 9.6 FL (ref 7.5–11.1)
POTASSIUM SERPLBLD-SCNC: 4.1 MMOL/L (ref 3.5–5.1)
RBC # BLD AUTO: 4.87 M/MM3 (ref 4–5.6)
SODIUM SERPL-SCNC: 139 MMOL/L (ref 136–145)
WBC # BLD AUTO: 9 K/MM3 (ref 4–10)

## 2020-10-08 RX ADMIN — DOCUSATE SODIUM SCH MG: 100 CAPSULE, LIQUID FILLED ORAL at 21:08

## 2020-10-08 RX ADMIN — ENOXAPARIN SODIUM SCH MG: 40 INJECTION SUBCUTANEOUS at 11:04

## 2020-10-08 RX ADMIN — Medication SCH MG: at 11:02

## 2020-10-08 RX ADMIN — FOLIC ACID SCH MG: 1 TABLET ORAL at 11:04

## 2020-10-08 RX ADMIN — LITHIUM CARBONATE SCH MG: 300 CAPSULE, GELATIN COATED ORAL at 11:03

## 2020-10-08 RX ADMIN — DOCUSATE SODIUM SCH MG: 100 CAPSULE, LIQUID FILLED ORAL at 11:02

## 2020-10-08 RX ADMIN — LITHIUM CARBONATE SCH MG: 300 CAPSULE, GELATIN COATED ORAL at 21:08

## 2020-10-08 NOTE — PN
Physical Exam: 


SUBJECTIVE: Patient seen and examined today, continues to remain without SI/HI 

or hallucinations.  States he is feeling better now and that 2020 was a 

difficult year for him since 3 people he was close with passed away. 








OBJECTIVE:





                                   Vital Signs











 Period  Temp  Pulse  Resp  BP Sys/Aguayo  Pulse Ox


 


 Last 24 Hr  91.6 F-98.5 F  58-63  18-20  100-118/52-60  95-97











GENERAL: The patient is awake, alert, and fully oriented


HEAD: Normal with no signs of trauma.


EYES: PERRL, extraocular movements intact, sclera anicteric, conjunctiva clear. 

No ptosis. 


NECK: Trachea midline, full range of motion, supple. 


LUNGS: Breath sounds equal, clear to auscultation bilaterally, no wheezes, no 

crackles, no accessory muscle use. 


HEART: Regular rate and rhythm, S1, S2 without murmur, rub or gallop.


ABDOMEN: Soft, nontender, nondistended, normoactive bowel sounds


EXTREMITIES: 2+ pulses, warm, well-perfused, no edema. 


NEUROLOGICAL: Normal speech, gait not observed.


PSYCH: Sad affect, depressed mood


SKIN: Warm, dry, normal turgor, scars noted on R femur 2/2 surgery, L wrist 2/2 

cutting 

















                         Laboratory Results - last 24 hr





                                    CBC, BMP





                                 10/08/20 06:42 





                                 10/08/20 06:42 








Active Medications











Generic Name Dose Route Start Last Admin





  Trade Name Freq  PRN Reason Stop Dose Admin


 


Acetaminophen  650 mg  10/03/20 15:39  10/06/20 06:36





  Tylenol -  PO   650 mg





  Q6H PRN   Administration





  PAIN LEVEL 4 - 6  


 


Docusate Sodium  100 mg  10/05/20 22:00  10/08/20 11:02





  Colace -  PO   100 mg





  BID MAGGIE   Administration


 


Enoxaparin Sodium  40 mg  10/03/20 16:30  10/08/20 11:04





  Lovenox -  SQ   40 mg





  DAILY MAGGIE   Administration


 


Folic Acid  1 mg  10/03/20 10:00  10/08/20 11:04





  Folic Acid -  PO   1 mg





  DAILY MAGGIE   Administration


 


Lithium Carbonate  300 mg  10/03/20 22:00  10/08/20 11:03





  Eskalith -  PO   300 mg





  BID MAGGIE   Administration


 


Risperidone  1 mg  10/03/20 22:00  10/08/20 11:02





  Risperdal -  PO   1 mg





  BID MAGGIE   Administration


 


Thiamine HCl  100 mg  10/03/20 10:00  10/08/20 11:02





  Vitamin B1 -  PO   100 mg





  DAILY MAGGIE   Administration











ASSESSMENT/PLAN:


46 yo M with PMHx of Bipolar Disorder, Schizoaffective Disorder, Alcohol Abuse, 

recent admission to [facility] for Detox- 9/17-9/20 presenting for left hip pain 

2/2 fall.





BIPOLAR DISORDER w/SEVERE DEPRESSION w/ SUICIDAL IDEATION & PSYCHOTIC FEATURES


- Ptn denied HI/SI or hallucinations today, states he is feeling happier 


- c/w Lithium 300mg BID


- c/w Risperidone 1mg BID


- c/w 1:1 renewed daily


- 2 PC form signed and completed in chart


- Social Work (Susan) working to transfer to Inpatient Psych --> possibly to 

[facility], spoke with a Autumn Gilbert





REPORTED EPISODE OF HEMATOCHEZIA


-c/w Colace 100mg BID for constipation


-Add Miralax as needed 


-FU GI on DC





Hx of RECENT FALL r/o MECHANICAL vs DRUG INDUCED


-CT Head/Spine/Pelvis:  No acute fracture


-XR Knee:  No acute fracture


-PT:  Full weight bearing w/ wide based gait.  c/w PT. 





ALCOHOL WITHDRAWAL


- Less anxious today


- c/w Thiamine 100mg Qdaily


- c/w Folic Acid 1mg qDaily





PPx


-DVT:  Lovenox 40mg





DISPO


-continue to monitor or 1:1, w/ goal of transfer to inpatient psych.  Patient 

may NOT leave AMA.





Visit type





- Emergency Visit


Emergency Visit: No





- New Patient


This patient is new to me today: No





- Critical Care


Critical Care patient: No





- Discharge Referral


Referred to [facility] No





ATTENDING PHYSICIAN STATEMENT





I saw and evaluated the patient.


I reviewed the resident's note and discussed the case with the resident.


I agree with the resident's findings and plan as documented.








SUBJECTIVE:








OBJECTIVE:








ASSESSMENT AND PLAN:

## 2020-10-08 NOTE — PN
Teaching Attending Note


Name of Resident: Darryl Rios





ATTENDING PHYSICIAN STATEMENT





I saw and evaluated the patient.


I reviewed the resident's note and discussed the case with the resident.


I agree with the resident's findings and plan as documented.








SUBJECTIVE:


Patient has no new complains. 





OBJECTIVE:


                                   Vital Signs











Temperature  97.9 F   10/08/20 14:00


 


Pulse Rate  66   10/08/20 14:00


 


Respiratory Rate  20   10/08/20 14:00


 


Blood Pressure  112/62   10/08/20 14:00


 


O2 Sat by Pulse Oximetry (%)  96   10/08/20 14:00








PE; per resident's note





                                      CBCD











WBC  9.0 K/mm3 (4.0-10.0)   10/08/20  06:42    


 


RBC  4.87 M/mm3 (4.00-5.60)   10/08/20  06:42    


 


Hgb  14.6 GM/dL (11.7-16.9)   10/08/20  06:42    


 


Hct  42.4 % (35.4-49)   10/08/20  06:42    


 


MCV  87.0 fl (80-96)   10/08/20  06:42    


 


MCHC  34.4 g/dl (32.0-35.9)   10/08/20  06:42    


 


RDW  13.6 % (11.9-15.9)   10/08/20  06:42    


 


Plt Count  241 K/MM3 (134-434)   10/08/20  06:42    


 


MPV  9.6 fl (7.5-11.1)   10/08/20  06:42    








                                       CMP











Sodium  139 mmol/L (136-145)   10/08/20  06:42    


 


Potassium  4.1 mmol/L (3.5-5.1)   10/08/20  06:42    


 


Chloride  107 mmol/L ()   10/08/20  06:42    


 


Carbon Dioxide  26 mmol/L (21-32)   10/08/20  06:42    


 


Anion Gap  6 MMOL/L (8-16)  L  10/08/20  06:42    


 


BUN  17.8 mg/dL (7-18)   10/08/20  06:42    


 


Creatinine  0.9 mg/dL (0.55-1.3)   10/08/20  06:42    


 


Random Glucose  83 mg/dL ()   10/08/20  06:42    


 


Calcium  8.6 mg/dL (8.5-10.1)   10/08/20  06:42    


 


Total Bilirubin  1.0 mg/dL (0.2-1)   10/03/20  06:59    


 


AST  18 U/L (15-37)   10/03/20  06:59    


 


ALT  39 U/L (13-61)   10/03/20  06:59    


 


Alkaline Phosphatase  101 U/L ()   10/03/20  06:59    


 


Total Protein  7.1 g/dl (6.4-8.2)   10/03/20  06:59    


 


Albumin  3.5 g/dl (3.4-5.0)   10/03/20  06:59    








                                 CARDIAC ENZYMES











Creatine Kinase  235 U/L ()   10/02/20  23:00    


 


Troponin I  < 0.03 ng/ml (0.00-0.05)   10/02/20  23:00    








                               Current Medications











Generic Name Dose Route Start Last Admin





  Trade Name Freq  PRN Reason Stop Dose Admin


 


Acetaminophen  650 mg  10/03/20 15:39  10/06/20 06:36





  Tylenol -  PO   650 mg





  Q6H PRN   Administration





  PAIN LEVEL 4 - 6  


 


Docusate Sodium  100 mg  10/05/20 22:00  10/08/20 11:02





  Colace -  PO   100 mg





  BID MAGGIE   Administration


 


Enoxaparin Sodium  40 mg  10/03/20 16:30  10/08/20 11:04





  Lovenox -  SQ   40 mg





  DAILY MAGGIE   Administration


 


Folic Acid  1 mg  10/03/20 10:00  10/08/20 11:04





  Folic Acid -  PO   1 mg





  DAILY MAGGIE   Administration


 


Lithium Carbonate  300 mg  10/03/20 22:00  10/08/20 11:03





  Eskalith -  PO   300 mg





  BID MAGGIE   Administration


 


Risperidone  1 mg  10/03/20 22:00  10/08/20 11:02





  Risperdal -  PO   1 mg





  BID MAGGIE   Administration


 


Thiamine HCl  100 mg  10/03/20 10:00  10/08/20 11:02





  Vitamin B1 -  PO   100 mg





  DAILY MAGIGE   Administration








                                Home Medications











 Medication  Instructions  Recorded


 


NK [No Known Home Medication]  09/17/20








                                  Microbiology





10/02/20 22:50   Blood - Peripheral Venous   Blood Culture - Final


                            NO GROWTH AFTER 5 DAYS INCUBATION


10/02/20 22:45   Blood - Peripheral Venous   Blood Culture - Final


                            NO GROWTH AFTER 5 DAYS INCUBATION


10/02/20 22:50   Urine - Urine Clean Catch   Urine Culture - Final


                            NO GROWTH OBTAINED





ASSESSMENT AND PLAN:


Patient is a 45yom with Pmhx of bioplar and depression and several suicidal 

attempts , withdrawal seizures, ETOH abuse, and non compliance with psych meds 

who presented with Suicidal ideation. . 





# Acute Suicidal ideation :psych is on the case , discussed with SW, trying to 

arrange a bed


# severe depression with psychotic features


# Bipolar Do: on respirdal and Lithium continue , lithium level 0.3, cont 1: 1  




# leukocytosis , resolved. cxs neg to date 


# Reported hematochezia .  H/H stable , outpatient GI F/u 


# s/p Fall :  No fx on  pelvis CT and R knee xray 


# ETOH withdrawal :  cont librium . last dose tomorrow at 5 pm, cont thiamine 

and Folic 


   DVT PX: lovenox 


   Cont 1:1. Can NOT leave AMA





completed detox. can be transferred  transferred to psych . 


waiting for bed in psych facility

## 2020-10-08 NOTE — PN
Mental Health Exam





- Mental Status Exam


Alert and Oriented to: Time, Place, Person


Cognitive Function: Grossly Intact


Patient Appearance: Unkempt


Mood: Apathetic, Withdrawn


Affect: Labile


Patient Behavior: Guarded, Distractible, Cooperative


Speech Pattern: Clear, Perseverating, Tangential


Voice Loudness: Mildly Soft/Quiet


Thought Process: Intact, Flight of Ideas


Thought Disorder: Not Present


Hallucinations: Denies


Suicidal Ideation: Current


Homicidal Ideation: Current


Insight/Judgement: Poor


Sleep: Poorly


Appetite: Good


Muscle strength/Tone: Normal


Gait/Station: Normal


Additional Comments: Client re -examined today on 1;1 for safety, suicidal 

ideation is present, with plan to jump in front of train, bringing others with 

him. Client is less restless. Client is adherent with bipolar medications.  

Client is labile, but in agreement with treatment plan for transfer to inpatient

psychiatyy for danger to self and others.

## 2020-10-09 VITALS — SYSTOLIC BLOOD PRESSURE: 111 MMHG | TEMPERATURE: 98.5 F | HEART RATE: 57 BPM | DIASTOLIC BLOOD PRESSURE: 63 MMHG

## 2020-10-09 LAB
ANION GAP SERPL CALC-SCNC: 5 MMOL/L (ref 8–16)
BASOPHILS # BLD: 0.7 % (ref 0–2)
BUN SERPL-MCNC: 19.8 MG/DL (ref 7–18)
CALCIUM SERPL-MCNC: 8.8 MG/DL (ref 8.5–10.1)
CHLORIDE SERPL-SCNC: 105 MMOL/L (ref 98–107)
CO2 SERPL-SCNC: 27 MMOL/L (ref 21–32)
CREAT SERPL-MCNC: 1 MG/DL (ref 0.55–1.3)
DEPRECATED RDW RBC AUTO: 13.4 % (ref 11.9–15.9)
EOSINOPHIL # BLD: 5.7 % (ref 0–4.5)
GLUCOSE SERPL-MCNC: 81 MG/DL (ref 74–106)
HCT VFR BLD CALC: 41.7 % (ref 35.4–49)
HGB BLD-MCNC: 14.3 GM/DL (ref 11.7–16.9)
LYMPHOCYTES # BLD: 15.1 % (ref 8–40)
MCH RBC QN AUTO: 29.7 PG (ref 25.7–33.7)
MCHC RBC AUTO-ENTMCNC: 34.4 G/DL (ref 32–35.9)
MCV RBC: 86.2 FL (ref 80–96)
MONOCYTES # BLD AUTO: 8.2 % (ref 3.8–10.2)
NEUTROPHILS # BLD: 70.3 % (ref 42.8–82.8)
PLATELET # BLD AUTO: 230 K/MM3 (ref 134–434)
PMV BLD: 9.6 FL (ref 7.5–11.1)
POTASSIUM SERPLBLD-SCNC: 4.2 MMOL/L (ref 3.5–5.1)
RBC # BLD AUTO: 4.83 M/MM3 (ref 4–5.6)
SODIUM SERPL-SCNC: 137 MMOL/L (ref 136–145)
WBC # BLD AUTO: 8.5 K/MM3 (ref 4–10)

## 2020-10-09 RX ADMIN — Medication SCH MG: at 11:15

## 2020-10-09 RX ADMIN — ENOXAPARIN SODIUM SCH MG: 40 INJECTION SUBCUTANEOUS at 11:15

## 2020-10-09 RX ADMIN — FOLIC ACID SCH MG: 1 TABLET ORAL at 11:15

## 2020-10-09 RX ADMIN — LITHIUM CARBONATE SCH MG: 300 CAPSULE, GELATIN COATED ORAL at 21:14

## 2020-10-09 RX ADMIN — DOCUSATE SODIUM SCH MG: 100 CAPSULE, LIQUID FILLED ORAL at 21:14

## 2020-10-09 RX ADMIN — DOCUSATE SODIUM SCH MG: 100 CAPSULE, LIQUID FILLED ORAL at 11:15

## 2020-10-09 RX ADMIN — LITHIUM CARBONATE SCH MG: 300 CAPSULE, GELATIN COATED ORAL at 11:15

## 2020-10-09 NOTE — PN
Teaching Attending Note


Name of Resident: Darryl Rios





ATTENDING PHYSICIAN STATEMENT





I saw and evaluated the patient.


I reviewed the resident's note and discussed the case with the resident.


I agree with the resident's findings and plan as documented.








SUBJECTIVE:


Patient is comfortable, with NAD. 





OBJECTIVE:


                                   Vital Signs











Temperature  97.7 F   10/09/20 14:22


 


Pulse Rate  63   10/09/20 14:22


 


Respiratory Rate  20   10/09/20 14:22


 


Blood Pressure  110/70   10/09/20 14:22


 


O2 Sat by Pulse Oximetry (%)  95   10/09/20 14:22








pe:PER RESIDENT'S NOTE





                                      CBCD











WBC  8.5 K/mm3 (4.0-10.0)   10/09/20  07:12    


 


RBC  4.83 M/mm3 (4.00-5.60)   10/09/20  07:12    


 


Hgb  14.3 GM/dL (11.7-16.9)   10/09/20  07:12    


 


Hct  41.7 % (35.4-49)   10/09/20  07:12    


 


MCV  86.2 fl (80-96)   10/09/20  07:12    


 


MCHC  34.4 g/dl (32.0-35.9)   10/09/20  07:12    


 


RDW  13.4 % (11.9-15.9)   10/09/20  07:12    


 


Plt Count  230 K/MM3 (134-434)   10/09/20  07:12    


 


MPV  9.6 fl (7.5-11.1)   10/09/20  07:12    








                                       CMP











Sodium  137 mmol/L (136-145)   10/09/20  07:12    


 


Potassium  4.2 mmol/L (3.5-5.1)   10/09/20  07:12    


 


Chloride  105 mmol/L ()   10/09/20  07:12    


 


Carbon Dioxide  27 mmol/L (21-32)   10/09/20  07:12    


 


Anion Gap  5 MMOL/L (8-16)  L  10/09/20  07:12    


 


BUN  19.8 mg/dL (7-18)  H  10/09/20  07:12    


 


Creatinine  1.0 mg/dL (0.55-1.3)   10/09/20  07:12    


 


Random Glucose  81 mg/dL ()   10/09/20  07:12    


 


Calcium  8.8 mg/dL (8.5-10.1)   10/09/20  07:12    


 


Total Bilirubin  1.0 mg/dL (0.2-1)   10/03/20  06:59    


 


AST  18 U/L (15-37)   10/03/20  06:59    


 


ALT  39 U/L (13-61)   10/03/20  06:59    


 


Alkaline Phosphatase  101 U/L ()   10/03/20  06:59    


 


Total Protein  7.1 g/dl (6.4-8.2)   10/03/20  06:59    


 


Albumin  3.5 g/dl (3.4-5.0)   10/03/20  06:59    








                                 CARDIAC ENZYMES











Creatine Kinase  235 U/L ()   10/02/20  23:00    


 


Troponin I  < 0.03 ng/ml (0.00-0.05)   10/02/20  23:00    








                               Current Medications











Generic Name Dose Route Start Last Admin





  Trade Name Freq  PRN Reason Stop Dose Admin


 


Acetaminophen  650 mg  10/03/20 15:39  10/06/20 06:36





  Tylenol -  PO   650 mg





  Q6H PRN   Administration





  PAIN LEVEL 4 - 6  


 


Docusate Sodium  100 mg  10/05/20 22:00  10/09/20 11:15





  Colace -  PO   100 mg





  BID MAGGIE   Administration


 


Enoxaparin Sodium  40 mg  10/03/20 16:30  10/09/20 11:15





  Lovenox -  SQ   40 mg





  DAILY MAGGIE   Administration


 


Folic Acid  1 mg  10/03/20 10:00  10/09/20 11:15





  Folic Acid -  PO   1 mg





  DAILY MAGGIE   Administration


 


Lithium Carbonate  300 mg  10/03/20 22:00  10/09/20 11:15





  Eskalith -  PO   300 mg





  BID MAGGIE   Administration


 


Risperidone  1 mg  10/03/20 22:00  10/09/20 11:15





  Risperdal -  PO   1 mg





  BID MAGGIE   Administration


 


Thiamine HCl  100 mg  10/03/20 10:00  10/09/20 11:15





  Vitamin B1 -  PO   100 mg





  DAILY MAGGIE   Administration








                                Home Medications











 Medication  Instructions  Recorded


 


NK [No Known Home Medication]  09/17/20








                                Laboratory Tests











  10/02/20 10/08/20 10/09/20





  23:00 19:15 12:15


 


COVID-19 ([facility]ARD)  Not detected  Not detected 


 


SARS-CoV-2 (PCR)    Negative





                                  Microbiology





10/02/20 22:50   Blood - Peripheral Venous   Blood Culture - Final


                            NO GROWTH AFTER 5 DAYS INCUBATION


10/02/20 22:45   Blood - Peripheral Venous   Blood Culture - Final


                            NO GROWTH AFTER 5 DAYS INCUBATION


10/02/20 22:50   Urine - Urine Clean Catch   Urine Culture - Final


                            NO GROWTH OBTAINED








ASSESSMENT AND PLAN:


Patient is a 45yom with Pmhx of bioplar and depression and several suicidal 

attempts , withdrawal seizures, ETOH abuse, and non compliance with psych meds 

who presented with Suicidal ideation. . 


No new events overnight 


# Acute Suicidal ideation :psych is on the case , discussed with SW, trying to 

arrange a bed


# severe depression with psychotic features


# Bipolar Do: on respirdal and Lithium continue , lithium level 0.3, cont 1: 1  




# leukocytosis , resolved. cxs neg to date 


# Reported hematochezia .  H/H stable , outpatient GI F/u 


# s/p Fall :  No fx on  pelvis CT and R knee xray 


# ETOH withdrawal :  completed cont thiamine and Folic 


   DVT PX: lovenox 


   Cont 1:1. Can NOT leave AMA





completed detox. can be transferred  transferred to psych . 


waiting for bed in psych facility

## 2020-10-09 NOTE — PN
Physical Exam: 


SUBJECTIVE: Patient seen and examined








OBJECTIVE:





                                   Vital Signs











 Period  Temp  Pulse  Resp  BP Sys/Aguayo  Pulse Ox


 


 Last 24 Hr  97.6 F-97.9 F  56-63  18-20  /58-70  95-96











GENERAL: The patient is awake, alert, and fully oriented, in no acute distress.


HEAD: Normal with no signs of trauma.


EYES: PERRL, extraocular movements intact, sclera anicteric, conjunctiva clear. 

No ptosis. 


ENT: Ears normal, nares patent, oropharynx clear without exudates, moist mucous 


membranes.


NECK: Trachea midline, full range of motion, supple. 


LUNGS: Breath sounds equal, clear to auscultation bilaterally, no wheezes, no cr

ackles, no 


accessory muscle use. 


HEART: Regular rate and rhythm, S1, S2 without murmur, rub or gallop.


ABDOMEN: Soft, nontender, nondistended, normoactive bowel sounds, no guarding, 

no 


rebound, no hepatosplenomegaly, no masses.


EXTREMITIES: 2+ pulses, warm, well-perfused, no edema. 


NEUROLOGICAL: Cranial nerves II through XII grossly intact. Normal speech, gait 

not 


observed.


PSYCH: Normal mood, normal affect.


SKIN: Warm, dry, normal turgor, no rashes or lesions noted














                         Laboratory Results - last 24 hr





                                    CBC, BMP





                                 10/09/20 07:12 





                                 10/09/20 07:12 











Active Medications











Generic Name Dose Route Start Last Admin





  Trade Name Freq  PRN Reason Stop Dose Admin


 


Acetaminophen  650 mg  10/03/20 15:39  10/06/20 06:36





  Tylenol -  PO   650 mg





  Q6H PRN   Administration





  PAIN LEVEL 4 - 6  


 


Docusate Sodium  100 mg  10/05/20 22:00  10/09/20 11:15





  Colace -  PO   100 mg





  BID MAGGIE   Administration


 


Enoxaparin Sodium  40 mg  10/03/20 16:30  10/09/20 11:15





  Lovenox -  SQ   40 mg





  DAILY MAGGIE   Administration


 


Folic Acid  1 mg  10/03/20 10:00  10/09/20 11:15





  Folic Acid -  PO   1 mg





  DAILY MAGGIE   Administration


 


Lithium Carbonate  300 mg  10/03/20 22:00  10/09/20 11:15





  Eskalith -  PO   300 mg





  BID MAGGIE   Administration


 


Risperidone  1 mg  10/03/20 22:00  10/09/20 11:15





  Risperdal -  PO   1 mg





  BID MAGGIE   Administration


 


Thiamine HCl  100 mg  10/03/20 10:00  10/09/20 11:15





  Vitamin B1 -  PO   100 mg





  DAILY MAGGIE   Administration











ASSESSMENT/PLAN:








ATTENDING PHYSICIAN STATEMENT





I saw and evaluated the patient.


I reviewed the resident's note and discussed the case with the resident.


I agree with the resident's findings and plan as documented.








SUBJECTIVE:








OBJECTIVE:








ASSESSMENT AND PLAN:

## 2020-10-09 NOTE — DS
Physical Exam: 


SUBJECTIVE: Patient seen and examined this morning, in no acute distress. Denied

F/V, HA, changes in vision, CP, SoB, N/V/D.  Anxiety decreased, agitation 

decreased. 








OBJECTIVE:





                                   Vital Signs











 Period  Temp  Pulse  Resp  BP Sys/[place]  Pulse Ox


 


 Last 24 Hr  97.6 F-97.9 F  56-63  18-20  /58-70  95-96








PHYSICAL EXAM





GENERAL: The patient is awake, alert, and fully oriented, in no acute distress.


HEAD: Normal with no signs of trauma.


EYES: PERRL, extraocular movements intact, sclera anicteric, conjunctiva clear. 


ENT: Ears normal, nares patent, oropharynx clear without exudates, moist mucous 

membranes.


NECK: Trachea midline, full range of motion, supple. 


LUNGS: Breath sounds equal, clear to auscultation bilaterally, no wheezes, no 

crackles, no accessory muscle use. 


HEART: Regular rate and rhythm, S1, S2 without murmur, rub or gallop.


ABDOMEN: Soft, nontender, nondistended, normoactive bowel sounds, no guarding, 

no rebound, no hepatosplenomegaly, no masses.


EXTREMITIES: 2+ pulses, warm, well-perfused, no edema. 


NEUROLOGICAL: Cranial nerves II through XII grossly intact. Normal speech, gait 

not observed.


PSYCH: Normal mood, normal affect.


SKIN: Warm, dry, normal turgor, no rashes or lesions noted.





LABS


                         Laboratory Results - last 24 hr











  10/06/20 10/09/20 10/09/20





  07:05 07:12 07:12


 


WBC   8.5 


 


RBC   4.83 


 


Hgb   14.3 


 


Hct   41.7 


 


MCV   86.2 


 


MCH   29.7 


 


MCHC   34.4 


 


RDW   13.4 


 


Plt Count   230 


 


MPV   9.6 


 


Absolute Neuts (auto)   5.9 


 


Neutrophils %   70.3 


 


Lymphocytes %   15.1 


 


Monocytes %   8.2 


 


Eosinophils %   5.7 H 


 


Basophils %   0.7 


 


Nucleated RBC %   0 


 


Sodium    137


 


Potassium    4.2


 


Chloride    105


 


Carbon Dioxide    27


 


Anion Gap    5 L


 


BUN    19.8 H


 


Creatinine    1.0


 


Est GFR (CKD-EPI)AfAm    104.88


 


Est GFR (CKD-EPI)NonAf    90.49


 


Random Glucose    81


 


Calcium    8.8


 


Chlordiazepoxide  2.0  


 


Norchlordiazepoxide  2.1  


 


Nordiazepam  0.1  


 


SARS-CoV-2 (PCR)   














  10/09/20





  12:15


 


WBC 


 


RBC 


 


Hgb 


 


Hct 


 


MCV 


 


MCH 


 


MCHC 


 


RDW 


 


Plt Count 


 


MPV 


 


Absolute Neuts (auto) 


 


Neutrophils % 


 


Lymphocytes % 


 


Monocytes % 


 


Eosinophils % 


 


Basophils % 


 


Nucleated RBC % 


 


Sodium 


 


Potassium 


 


Chloride 


 


Carbon Dioxide 


 


Anion Gap 


 


BUN 


 


Creatinine 


 


Est GFR (CKD-EPI)AfAm 


 


Est GFR (CKD-EPI)NonAf 


 


Random Glucose 


 


Calcium 


 


Chlordiazepoxide 


 


Norchlordiazepoxide 


 


Nordiazepam 


 


SARS-CoV-2 (PCR)  Negative











HOSPITAL COURSE:





Date of Admission:10/03/20





Date of Discharge: 10/09/20





44 yo M w/ PMHx of Bipolar Disorder, Schizoaffective Disorder, Alcohol Abuse, 

recent admission to [facility] for Detox- 9/17-9/20. Who presented to [facility]

ED for left hip pain s/p fall. Patient admitted to drinking beer and Henessy 

daily, last drink day of admission. Patient reported to EMT and staff while en 

route to the [facility] having Suicidal Ideation. Patient reports not taking 

his "psych meds" x1week and feeling depressed. Patient reported having a 

suicidal plan- laying down on the railroad tracks, and having a train run him 

over. He freely talked about his prior suicide attempts. Patient denies 

homicidal ideation, visual or auditory hallucinations.





Patient denied fever, chills, cough, dizziness, SOB, CP, palpitations, AP, 

N/V/D, constipation, dysuria. Patient denied recent sick contacts or travel.





 


ED course was noted for:





(1) WBC 24.6


(2) Temp 100.5


(3) Chest Xray- no acute pathology


(4) Left Hip Xray- no acute pelvic or left hip pathology





Ptn admitted to medical floor on 1:1 supervision 2/2 SI.  He continued to 

endorse SI, and began endorsing HI as well as auditory hallucinations.  Ptn was 

anxious and aggitated, however never threatening.  Ptn additionally endorsed 

hematochezia, althtough at the time, had no episodes.





Psych consulted, and Lithium/Risperidone started.  Librium protocol for Etoh 

withdrawal.  Over the following days, ptn mood improved, hallucinations stopped.

  





Initial leukocytosis and fever abated, with blood and urine cx returning 

negative.





Ptn was accepted to [facility] for psychiatric transfer after a repeat covid swab. 





At the time of transfer, patient was medically stable.  


Minutes to complete discharge: 36





Discharge Summary


Problems reviewed: Yes


Reason For Visit: ALCOHOL WITHDRAWAL, LEUKOCYTOSIS, FEVER


Current Active Problems





Bipolar disorder (Chronic)


Schizoaffective disorder (Chronic)


Suicidal ideations (Chronic)








Condition: Stable





- Instructions


Diet, Activity, Other Instructions: 


YOUR VISIT


You came to the hospital because you were experiencing alcohol withdrawal, 

suicidal and homicidal thoughts, as well as hallucinations.  You additionally 

had a fall on your left hip which showed no injury with imaging. You were 

admitted to the hospital for care of these symptoms. You are now stable and may 

be transferred to the psychiatric facility at [facility] for 

further management.





Of note, during your stay here we also found:


1.)CAT Scan Head: Evidence of a "retention cyst or polyp" in your left maxilla, 

this is part of your sinuses located in your face, and we have referred you to 

an Ear, Nose and Throat doctor for management. 





MEDICATIONS


-You were STARTED on Risperidone 1mg twice a day (Morning and Night), for your 

mental health.  Please continue to take this medication


-You were STARTED on Lithium 300mg twice a day (Morning and Night) for your 

mental health.  Please continue to take this medication, and follow up with your

primary care provider for followup


-You were STARTED on Thiamine 100mg once a day for your nutrition due to your 

alcohol use.  Please continue to take this medication.


-You were STARTED on Folic Acid 1mg once a day for your nutrition due to your 

alcohol use.  Please continue to take this medication.





ADDITIONAL CARE


-Please make an appointment to see a primary care provider after your are 

discharged from [facility]. Since you do not have one, you can 

call [phone] to schedule an appointment at the [facility] residents' clinic, located at [address] [place] [address].  

If you would like to continue seeing Dr. Darryl Rios, please ask for a Tuesday

morning appointment. 


-Please make an appointment to see Dr. Александр Cevallos (Addiction Medicine) for 

evaluation of your substance use


-Please make an appointment to see Dr. Franci Ross (Gastroenterology) for 

evaluation of your previous bowel movements with blood.


-Please make an appointment to see Dr. Oren Enciso (ENT) for evaluation of 

your sinuses.  





ADDITIONAL INFORMATION


-Please call the National Suicide Prevention Lifeline at 847-230-2556 if you 

have any thoughts of hurting yourself or others.  


-Please call 911 or come directly to the emergency department if you experience 

recurrence of the symptoms that brought you to the hospital, unusual headache, 

vision change, shortness of breath, chest pain, numbness, tingling, loss of 

alertness/awareness, loss of function, unusual bleeding or any alarming 

symptoms.





Referrals: 


Waylon Richey MD [Staff Physician] - 


Franci Ross DO [Staff Physician] -  (Prior episodes of hematochezia, since 

resolved.  )


Александр Cevallos DO [Staff Physician] -  (History of alcohol use and withdrawal)


Disposition: TRANSFER ACUTE CARE/OTHER HOSP





- Home Medications


Comprehensive Discharge Medication List: 


Ambulatory Orders





Folic Acid - 1 mg PO DAILY  tablet 10/09/20 


Lithium Carbonate [Eskalith -] 300 mg PO BID #0 capsule 10/09/20 


Risperidone [Risperdal -] 1 mg PO BID  tablet 10/09/20 


Thiamine HCl [Vitamin B1 -] 100 mg PO DAILY  tablet 10/09/20 








This patient is new to me today: No


Emergency Visit: No


Critical Care patient: No





- Discharge Referral


Referred to [facility] No





ATTENDING PHYSICIAN STATEMENT





I saw and evaluated the patient.


I reviewed the resident's note and discussed the case with the resident.


I agree with the resident's findings and plan as documented.








SUBJECTIVE:








OBJECTIVE:








ASSESSMENT AND PLAN:

## 2022-03-30 ENCOUNTER — HOSPITAL ENCOUNTER (EMERGENCY)
Dept: HOSPITAL 74 - JER | Age: 47
LOS: 1 days | Discharge: ANOTHER HOSPITAL | End: 2022-03-31
Payer: COMMERCIAL

## 2022-03-30 VITALS — BODY MASS INDEX: 29.5 KG/M2

## 2022-03-30 DIAGNOSIS — R45.850: ICD-10-CM

## 2022-03-30 DIAGNOSIS — R45.851: ICD-10-CM

## 2022-03-30 DIAGNOSIS — R07.9: Primary | ICD-10-CM

## 2022-03-30 LAB
ALBUMIN SERPL-MCNC: 3.4 G/DL (ref 3.4–5)
ALP SERPL-CCNC: 69 U/L (ref 45–117)
ALT SERPL-CCNC: 38 U/L (ref 13–61)
ANION GAP SERPL CALC-SCNC: 6 MMOL/L (ref 8–16)
AST SERPL-CCNC: 22 U/L (ref 15–37)
BASOPHILS # BLD: 0.8 % (ref 0–2)
BILIRUB SERPL-MCNC: 0.6 MG/DL (ref 0.2–1)
BUN SERPL-MCNC: 15 MG/DL (ref 7–18)
CALCIUM SERPL-MCNC: 8.7 MG/DL (ref 8.5–10.1)
CHLORIDE SERPL-SCNC: 105 MMOL/L (ref 98–107)
CO2 SERPL-SCNC: 27 MMOL/L (ref 21–32)
CREAT SERPL-MCNC: 0.8 MG/DL (ref 0.55–1.3)
DEPRECATED RDW RBC AUTO: 13.9 % (ref 11.9–15.9)
EOSINOPHIL # BLD: 2.2 % (ref 0–4.5)
GLUCOSE SERPL-MCNC: 123 MG/DL (ref 74–106)
HCT VFR BLD CALC: 37.1 % (ref 35.4–49)
HGB BLD-MCNC: 12.6 GM/DL (ref 11.7–16.9)
LYMPHOCYTES # BLD: 9.6 % (ref 8–40)
MAGNESIUM SERPL-MCNC: 2.2 MG/DL (ref 1.8–2.4)
MCH RBC QN AUTO: 28.4 PG (ref 25.7–33.7)
MCHC RBC AUTO-ENTMCNC: 34 G/DL (ref 32–35.9)
MCV RBC: 83.7 FL (ref 80–96)
MONOCYTES # BLD AUTO: 11.6 % (ref 3.8–10.2)
NEUTROPHILS # BLD: 75.8 % (ref 42.8–82.8)
PLATELET # BLD AUTO: 267 10^3/UL (ref 134–434)
PMV BLD: 9.6 FL (ref 7.5–11.1)
PROT SERPL-MCNC: 6.4 G/DL (ref 6.4–8.2)
RBC # BLD AUTO: 4.43 M/MM3 (ref 4–5.6)
SODIUM SERPL-SCNC: 137 MMOL/L (ref 136–145)
WBC # BLD AUTO: 12.3 K/MM3 (ref 4–10)

## 2022-03-30 PROCEDURE — U0005 INFEC AGEN DETEC AMPLI PROBE: HCPCS

## 2022-03-30 PROCEDURE — U0003 INFECTIOUS AGENT DETECTION BY NUCLEIC ACID (DNA OR RNA); SEVERE ACUTE RESPIRATORY SYNDROME CORONAVIRUS 2 (SARS-COV-2) (CORONAVIRUS DISEASE [COVID-19]), AMPLIFIED PROBE TECHNIQUE, MAKING USE OF HIGH THROUGHPUT TECHNOLOGIES AS DESCRIBED BY CMS-2020-01-R: HCPCS

## 2022-03-31 VITALS — SYSTOLIC BLOOD PRESSURE: 133 MMHG | HEART RATE: 74 BPM | TEMPERATURE: 98.8 F | DIASTOLIC BLOOD PRESSURE: 78 MMHG
